# Patient Record
(demographics unavailable — no encounter records)

---

## 2017-07-04 NOTE — DIAGNOSTIC IMAGING REPORT
Indication: Pain



Technique: CT scan of the chest, abdomen, and pelvis was performed with intravenous

and oral contrast material. Axial, coronal, and sagittal images were generated.



Dose:

Total Dose Length Product - DLP 1063 mGycm.

Volume CT Dose Index - CTDIvol(s) 48.67, 13.84, 13.28 mGy.



Comparison: None



Findings:



Chest: There are is a large right pleural effusion. Volume loss is noted in most of

the right lung. There is a rounded mass in the right upper lobe measuring 4.8 cm.

This encompasses the right upper lobe bronchus. There is nodularity in the right

pleural space. And pleural space are unremarkable. There is some mediastinal

adenopathy in the precarinal region and right paratracheal region or superior aspect

of the right hilum. The heart is normal in size. Degenerative changes noted in the

spine. A calcified spur is noted at T10-T11 narrowing the spinal canal. There is 

a

calcification in the right lower lobe.



Abdomen and pelvis: The liver is diffusely low density. The gallbladder is

unremarkable. The spleen is normal. The pancreas is unremarkable. The left adrenal

gland contains a mass measuring approximately 1.9 cm. The right adrenal gland is

normal. The kidneys are normal in size. Low-density masses are noted in the 

kidneys

bilaterally consistent with cysts. The aorta is normal in caliber. Minimal

calcification is noted in the aorta. The retroperitoneum is free of adenopathy.

There is mild dilatation of proximal small bowel loops. No definite obstruction

however. The appendix is normal. The bladder is unremarkable. A double, near the

fundus of the uterus. There is narrowing of the spinal canal at L4-L5 by

degenerative changes. Multiple diverticula are noted in the colon. There is no

evidence of diverticulitis.





Impression: Mass in the right upper lobe encompassing the right upper lobe bronchus

with volume loss in the right lung and large pleural effusion. In addition,

nodularity is noted in the right pleural space. This is most consistent with 

primary

pulmonary neoplasm and malignant pleural effusion. Cytology of pleural fluid would

be helpful.



Old granulomatous disease. Heart mild dilatation of small bowel loops,

nonobstructive.



Fatty liver.



Diverticulosis. Degenerative change of the spine.



We'll cysts



Narrowing of the spinal canal as noted above at T10-T11 and L4-L5.







The CT scanner at [facility] is accredited by the American College 

of

Radiology and the scans are performed using protocols designed to limit 

radiation

exposure to as low as reasonably achievable to attain images of sufficient

resolution adequate for diagnostic evaluation.

## 2017-07-04 NOTE — DIAGNOSTIC IMAGING REPORT
Indication: Chest pain



Technique: XRAY CHEST 1 V



Comparison:None



Findings: There is a large right pleural effusion. Volume loss is noted in the 

right

lung. Left lung is clear. Heart is probably normal in size. The bones are

unremarkable.



Impression: Large right pleural effusion.

## 2017-07-04 NOTE — CONSULTATION
DATE OF CONSULTATION:  2017



CONSULTATION REPORT



CONSULTING PHYSICIAN:  Yosef Daniels M.D.



REFERRING PHYSICIAN:  Otis Quintanilla M.D.



HISTORY OF PRESENT ILLNESS:  The patient is a 58-year-old, 

female, who was well known to me for right upper lobe nodule and pleural

effusion, who presented to the emergency room [facility] for

recurrent pleural effusion.  Workup including a chest x-ray demonstrated a

right-sided pleural effusion.  Thoracic surgery was then consulted for

further evaluation.



PAST MEDICAL HISTORY:  Past medical history was notable for:



1. Hypertension.

2. Diabetes.

3. Asthma.



PAST SURGICAL HISTORY:  Was noted for multiple cosmetic surgery

including breast, lips, tummy tuck and liposuction from  to .



MEDICATIONS:  Inclusion Januvia, metformin, and antihypertensive

medication.



ALLERGIES:  The patient is known to have allergies to penicillin.



FAMILY HISTORY:  The patient's father  from car accident.

Mother was murdered.  She is  and live with her  and

daughter in [facility] and she works as a .



SOCIAL HISTORY:  The patient denies tobacco, alcohol, or illicit drug

use.



PHYSICAL EXAMINATION:

VITAL SIGNS:  She is noted to be afebrile.  Her vitals are within

normal limits.

CARDIAC:  Regular rate and rhythm.  No gallops.  No murmur.

RESPIRATORY:  Clear to auscultation on the left and decreased breath

sounds with palpable node on the right.

ABDOMEN:  Soft, nondistended, and nontender with normoactive bowel

sounds.

EXTREMITIES:  Showed no evidence of cyanosis, clubbing, or edema.



LABORATORY AND DIAGNOSTIC DATA:  Laboratory study performed on

2017 showed WBC of 9.5, hemoglobin 11, hematocrit 35 and a platelet

count of 368,000.  Sodium is 138, potassium 4.9, chloride 101, bicarbonate

was 24, BUN is 8, creatinine 0.7, and glucose is 199.  PT is 9.8, PTT 26

and INR 0.9.  A chest x-ray was performed on 2017 demonstrated a

large right-sided pleural effusion.



ASSESSMENT AND PLAN:  This is a 58-year-old,  female, who

presented with a right upper lobe nodule associated with recurrent right

pleural effusion.  The patient was evaluated at bedside after reviewing

her clinical database.  We will proceed to obtain a chest, abdomen and

pelvic CT scan and in preparation for exploratory video-assisted

thorascopic surgery, surgery for tissue diagnosis of pleurodesis.



I want to thank you for referring this patient to my attention, if there

are any questions in regard to this patient's clinical care, please do not

hesitate to contact me.









  ______________________________________________

  Rader M.D.





DR:  Isidra

D:  2017 10:12

T:  2017 12:15

JOB#:  [id number]

CC:



DAISY

## 2017-07-04 NOTE — HISTORY AND PHYSICAL
History of Present Illness


General


Reason for Hospitalization:  General Complaint





Present Illness


Allergies:  


Coded Allergies:  


     PENICILLINS (Unverified  Allergy, Unknown, 7/3/17)


Uncoded Allergies:  


     IV CONTRAST (Allergy, Mild, Hives, 7/4/17)


     PENICILLIN (Allergy, Unknown, 7/3/17)





Medication History


Scheduled


Amlodipine Besylate/Benazepril 5-20 Mg (Lotrel 5-20 Mg Capsule*), 1 CAP ORAL 

DAILY, (Reported)


Atorvastatin Calcium* (Atorvastatin Calcium*), 5 MG ORAL BEDTIME, (Reported)


Metformin Hcl* (Metformin Hcl*), 1,000 MG ORAL DAILY, (Reported)


Sitagliptin* (Januvia*), 100 MG ORAL DAILY, (Reported)





Scheduled PRN


Hydrocodone Bit/Acetaminophen * (Norco *), 1 TAB ORAL Q4H PRN for 

For Pain, (Reported)





Patient History


Healthcare decision maker





Resuscitation status


Full Code


Advanced Directive on File








Physical Exam





Last 24 Hour Vital Signs








  Date Time  Temp Pulse Resp B/P Pulse Ox O2 Delivery O2 Flow Rate FiO2


 


7/4/17 20:23  94      


 


7/4/17 20:00 98.4 97 20 128/68 98 Room Air  


 


7/4/17 16:00 98.2 119 20 151/91 94 Room Air  


 


7/4/17 11:48 98.1 106 18 113/68 96 Nasal Cannula 2.0 


 


7/4/17 07:36 97.5 88 18 121/78 96 Nasal Cannula 2.0 


 


7/4/17 04:00 97.9 91 18 120/82 94 Nasal Cannula 2.0 


 


7/4/17 02:20 98.6 94 20 140/90 95 Nasal Cannula 2.0 


 


7/4/17 01:59 98.6 94 20 140/90 95 Nasal Cannula 2.0 


 


7/4/17 00:20 98.8       


 


7/4/17 00:15  92 19 148/83 92 Room Air  


 


7/3/17 23:44  93 20 133/80 97 Room Air  

















Intake and Output  


 


 7/3/17 7/4/17





 19:00 07:00


 


Intake Total  1120 ml


 


Balance  1120 ml


 


  


 


Intake Oral  120 ml


 


IV Total  1000 ml


 


# Voids  1











Laboratory Tests








Test


  7/4/17


05:20


 


White Blood Count


  9.5 K/UL


(4.8-10.8)


 


Red Blood Count


  4.24 M/UL


(4.20-5.40)


 


Hemoglobin


  11.5 G/DL


(12.0-16.0)  L


 


Hematocrit


  35.1 %


(37.0-47.0)  L


 


Mean Corpuscular Volume 83 FL (80-99)  


 


Mean Corpuscular Hemoglobin


  27.2 PG


(27.0-31.0)


 


Mean Corpuscular Hemoglobin


Concent 32.8 G/DL


(32.0-36.0)


 


Red Cell Distribution Width


  12.3 %


(11.6-14.8)


 


Platelet Count


  360 K/UL


(150-450)


 


Mean Platelet Volume


  7.6 FL


(6.5-10.1)


 


Neutrophils (%) (Auto)


  % (45.0-75.0)


 


 


Lymphocytes (%) (Auto)


  % (20.0-45.0)


 


 


Monocytes (%) (Auto)  % (1.0-10.0)  


 


Eosinophils (%) (Auto)  % (0.0-3.0)  


 


Basophils (%) (Auto)  % (0.0-2.0)  


 


Sodium Level


  138 mEQ/L


(135-145)


 


Potassium Level


  4.9 mEQ/L


(3.4-4.9)


 


Chloride Level


  101 mEQ/L


()


 


Carbon Dioxide Level


  24 mEQ/L


(20-30)


 


Anion Gap 13 (5-15)  


 


Blood Urea Nitrogen


  8 mg/dL (7-23)


 


 


Creatinine


  0.7 mg/dL


(0.5-0.9)


 


Estimat Glomerular Filtration


Rate > 60 mL/min


(>60)


 


Glucose Level


  199 mg/dL


()  H


 


Calcium Level


  9.3 mg/dL


(8.6-10.2)


 


Total Bilirubin


  < 0.2 mg/dL


(0.0-1.2)


 


Aspartate Amino Transf


(AST/SGOT) 22 U/L (5-40)  


 


 


Alanine Aminotransferase


(ALT/SGPT) 29 U/L (3-33)  


 


 


Alkaline Phosphatase


  80 U/L


()


 


Total Protein


  7.0 g/dL


(6.6-8.7)


 


Albumin


  3.9 g/dL


(3.5-5.2)


 


Globulin 3.1 g/dL  


 


Albumin/Globulin Ratio 1.2 (1.0-2.7)  


 


Triglycerides Level


  65 mg/dL (<


150)


 


Cholesterol Level


  133 mg/dL (<


200)


 


LDL Cholesterol


  66 mg/dL


(60-99)


 


HDL Cholesterol


  54 mg/dL (>


60)


 


Cholesterol/HDL Ratio


  2.5 (3.3-4.4)


L


 


Thyroid Stimulating Hormone


(TSH) 1.120 uIU/mL


(0.300-4.500)








Height (Feet):  5


Height (Inches):  3.00


Weight (Pounds):  158


Medications





Current Medications








 Medications


  (Trade)  Dose


 Ordered  Sig/Elian


 Route


 PRN Reason  Start Time


 Stop Time Status Last Admin


Dose Admin


 


 Acetaminophen


  (Tylenol)  650 mg  Q4H  PRN


 ORAL


 T>100.5  7/4/17 17:45


 8/3/17 17:44   


 


 


 Acetaminophen/


 Hydrocodone Bitart


  (Norco 10/325)  1 ea  Q4H  PRN


 ORAL


 Moderate Pain (Pain Scale 4-6)  7/4/17 17:30


 7/11/17 17:29   


 


 


 Al Hydroxide/Mg


 Hydroxide


  (Mylanta II)  30 ml  Q6H  PRN


 ORAL


 dyspepsia  7/4/17 17:30


 8/3/17 17:29   


 


 


 Dextrose


  (Dextrose 50%)    STAT  PRN


 IV


 Hypoglycemia  7/4/17 17:30


 8/3/17 17:29   


 


 


 Insulin Aspart


  (NovoLOG)    BEFORE MEALS AND  HS


 SUBQ


   7/4/17 21:00


 8/3/17 20:59  7/4/17 21:08


 


 


 Lorazepam


  (Ativan 2mg/ml


 1ml)  0.5 mg  Q4H  PRN


 IV


 For Anxiety  7/4/17 17:30


 7/11/17 17:29   


 


 


 Morphine Sulfate


  (Morphine


 Sulfate)  1 mg  Q4H  PRN


 IVP


 Severe Pain (Pain Scale 7-10)  7/4/17 17:30


 7/11/17 17:29  7/4/17 22:44


 


 


 Ondansetron HCl


  (Zofran)  4 mg  Q6H  PRN


 IVP


 Nausea & Vomiting  7/4/17 17:30


 8/3/17 17:29   


 


 


 Polyethylene


 Glycol


  (Miralax)  17 gm  HSPRN  PRN


 ORAL


 Constipation  7/4/17 21:00


 8/3/17 20:59   


 


 


 Zolpidem Tartrate


  (Ambien)  5 mg  HSPRN  PRN


 ORAL


 Insomnia  7/4/17 21:00


 8/3/17 20:59   


 

















YESENIA MELARA Jul 4, 2017 22:45

## 2017-07-05 NOTE — CARDIOLOGY REPORT
--------------- APPROVED REPORT --------------





EKG Measurement

Heart [id number]

NE [id number]

[id number]

[id number]

[id number]





Sinus tachycardia

Otherwise normal ECG

## 2017-07-05 NOTE — ANETHESIA PREOPERATIVE EVAL
Anesthesia Pre-op PMH/ROS


General


Date of Evaluation:  2017


Time of Evaluation:  14:26


Anesthesiologist:  Junior


ASA Score:  ASA 3


Mallampati Score


Class I : Soft palate, uvula, fauces, pillars visible


Class II: Soft palate, uvula, fauces visible


Class III: Soft palate, base of uvula visible


Class IV: Only hard plate visible


Mallampati Classification:  Class III


Surgeon:  Jeremiah


Diagnosis:  R Malignant Pleural Effusion


Surgical Procedure:  R VATS


Family History:  no anesthesia problems


Allergies:  


Coded Allergies:  


     PENICILLINS (Unverified  Allergy, Unknown, 7/3/17)


Uncoded Allergies:  


     IV CONTRAST (Allergy, Mild, Hives, 17)


Medications:  see eMAR





Past Medical History


Cardiovascular:  Reports: HTN


Pulmonary:  Reports: asthma, other - Malignant Pleural Effusion


Endocrine:  Reports: DM





Anesthesia Pre-op Phys. Exam


Physician Exam





Last Vital Signs








  Date Time  Temp Pulse Resp B/P Pulse Ox O2 Delivery O2 Flow Rate FiO2


 


17 12:00  93      


 


17 11:46 98.2  18 139/92 96 Nasal Cannula 2.0 








Constitutional:  NAD


Neurologic:  CN 2-12 intact


Cardiovascular:  RRR


Respiratory:  CTA


Gastrointestinal:  S/NT/ND





Airway Exam


Mallampati Score:  Class III


MO:  full


ROM:  full


Teeth:  intact - Large Teeth





Anesthesia Pre-op A/P


Labs





Coagulation








Test


  17


11:15


 


Prothrombin Time


  9.6 SEC


(9.30-11.50)


 


Prothromb Time International


Ratio 0.9 (0.9-1.1)  


 











Risk Assessment & Plan


Assessment:


ASA 3


Plan:


GA, NAYELI, BIS, Glidescope


Status Change Before Surgery:  No





Pre-Antibiotics


Dru Grams Ancef IV


Given Within 1 Hr of Incision:  Yes


Time Given:  15:18











Vincenzo Pacheco MD 2017 14:23

## 2017-07-05 NOTE — PULMONOLOGY PROGRESS NOTE
Assessment/Plan


Problems:  


(1) Lung mass


(2) Recurrent pleural effusion on right


Assessment/Plan


for thoracoscopy today





Subjective


ROS Limited/Unobtainable:  No


Interval Events:  pain in better controlled


Constitutional:  Reports: no symptoms


HEENT:  Repors: no symptoms


Allergies:  


Coded Allergies:  


     PENICILLINS (Unverified  Allergy, Unknown, 7/3/17)


Uncoded Allergies:  


     IV CONTRAST (Allergy, Mild, Hives, 7/4/17)


     PENICILLIN (Allergy, Unknown, 7/3/17)





Objective





Last 24 Hour Vital Signs








  Date Time  Temp Pulse Resp B/P Pulse Ox O2 Delivery O2 Flow Rate FiO2


 


7/5/17 11:46 98.2 94 18 139/92 96 Nasal Cannula 2.0 


 


7/5/17 08:34 97.9 109 18 137/86 96 Nasal Cannula 2.0 


 


7/5/17 08:00  96      


 


7/5/17 04:00 97.9 96 20 135/80 95 Room Air  


 


7/5/17 03:37  93      


 


7/5/17 00:00 97.2 89 20 135/84 98 Room Air  


 


7/4/17 23:46  87      


 


7/4/17 20:23  94      


 


7/4/17 20:00 98.4 97 20 128/68 98 Room Air  


 


7/4/17 16:00 98.2 119 20 151/91 94 Room Air  

















Intake and Output  


 


 7/4/17 7/5/17





 19:00 07:00


 


Intake Total 360 ml 


 


Balance 360 ml 


 


  


 


Intake Oral 360 ml 


 


# Voids 3 3








General Appearance:  WD/WN, no acute distress


HEENT:  normocephalic, atraumatic


Respiratory/Chest:  chest wall non-tender, lungs clear


Breasts:  no masses


Cardiovascular:  normal peripheral pulses


Abdomen:  normal bowel sounds, soft, non tender


Genitourinary:  normal external genitalia


Extremities:  no cyanosis


Skin:  no rash, no ulcers


Neurologic/Psychiatric:  CNs II-XII grossly normal, no motor/sensory deficits


Lymphatic:  no neck adenopathy, no groin adenopathy


Laboratory Tests


7/5/17 11:15: 


Prothrombin Time [Pending], Prothromb Time International Ratio [Pending]





Current Medications








 Medications


  (Trade)  Dose


 Ordered  Sig/Elian


 Route


 PRN Reason  Start Time


 Stop Time Status Last Admin


Dose Admin


 


 Acetaminophen


  (Tylenol)  650 mg  Q4H  PRN


 ORAL


 T>100.5  7/4/17 17:45


 8/3/17 17:44   


 


 


 Acetaminophen/


 Hydrocodone Bitart


  (Norco 10/325)  1 ea  Q4H  PRN


 ORAL


 Moderate Pain (Pain Scale 4-6)  7/4/17 17:30


 7/11/17 17:29   


 


 


 Al Hydroxide/Mg


 Hydroxide


  (Mylanta II)  30 ml  Q6H  PRN


 ORAL


 dyspepsia  7/4/17 17:30


 8/3/17 17:29   


 


 


 Dextrose


  (Dextrose 50%)    STAT  PRN


 IV


 Hypoglycemia  7/4/17 17:30


 8/3/17 17:29   


 


 


 Insulin Aspart


  (NovoLOG)    BEFORE MEALS AND  HS


 SUBQ


   7/4/17 21:00


 8/3/17 20:59  7/5/17 06:41


 


 


 Lorazepam


  (Ativan 2mg/ml


 1ml)  0.5 mg  Q4H  PRN


 IV


 For Anxiety  7/4/17 17:30


 7/11/17 17:29   


 


 


 Morphine Sulfate


  (Morphine


 Sulfate)  1 mg  Q3H  PRN


 IVP


 Severe Pain (Pain Scale 7-10)  7/5/17 01:45


 7/12/17 01:44  7/5/17 12:04


 


 


 Morphine Sulfate


  (Morphine


 Sulfate)  1 mg  Q3H  PRN


 IVP


 Breakthrough Pain  7/5/17 13:00


 7/12/17 12:59   


 


 


 Ondansetron HCl


  (Zofran)  4 mg  Q6H  PRN


 IVP


 Nausea & Vomiting  7/4/17 17:30


 8/3/17 17:29   


 


 


 Polyethylene


 Glycol


  (Miralax)  17 gm  HSPRN  PRN


 ORAL


 Constipation  7/4/17 21:00


 8/3/17 20:59   


 


 


 Zolpidem Tartrate


  (Ambien)  5 mg  HSPRN  PRN


 ORAL


 Insomnia  7/4/17 21:00


 8/3/17 20:59   


 

















YESENIA MELARA Jul 5, 2017 12:50

## 2017-07-05 NOTE — PRE-PROCEDURE NOTE/ATTESTATION
Pre-Procedure Note/Attestation


Complete Prior to Procedure


Planned Procedure:  right


Procedure Narrative:


Bronchoscopy, right exploratory video-assisted thoracoscopic surgery, possible 

talc pleurodesis or pleurex catheter





Indications for Procedure


Pre-Operative Diagnosis:


Recurrent right pleural effusion





Attestation


I attest that I discussed the nature of the procedure; its benefits; risks and 

complications; and alternatives (and the risks and benefits of such alternatives

), prior to the procedure, with the patient (or the patient's legal 

representative).





I attest that, if there was a reasonable possibility of needing a blood 

transfusion, the patient (or the patient's legal representative) was given the 

[place] Department of [employer] Services standardized written summary, pursuant 

to the [employer] [place] Blood Safety Act ([place] Health and Safety Code # 1645, as 

amended).





I attest that I re-evaluated the patient just prior to the surgery and that 

there has been no change in the patient's H&P, except as documented below:











TANMAY GRAHAM M.D. Jul 5, 2017 14:14

## 2017-07-05 NOTE — IMMEDIATE POST-OP EVALUATION
Immediate Post-Op Evalulation


Immediate Post-Op Evalulation


Procedure:  R VATS


Date of Evaluation:  2017


Time of Evaluation:  17:29


IV Fluids:  1000 LR


Blood Products:  0


Estimated Blood Loss:  100


Urinary Output:  0


Blood Pressure Systolic:  139


Blood Pressure Diastolic:  96


Pulse Rate:  72


Respiratory Rate:  16


O2 Sat by Pulse Oximetry:  99


Temperature (Fahrenheit):  97.6


Pain Score (1-10):  2


Nausea:  No


Vomiting:  No


Complications


0


Patient Status:  awake, reacts, patent, extubated, none


Hydration Status:  adequate


Dru grams Ancef iv


Given Within 1 Hr of Incision:  Yes


Time Given:  15:18











Vincenzo Pacheco MD 2017 17:22

## 2017-07-05 NOTE — CARDIOLOGY REPORT
--------------- APPROVED REPORT --------------





EKG Measurement

Heart [id number]

AZ 138P40

[id number]

[id number]

[id number]





Sinus tachycardia

Otherwise normal ECG

## 2017-07-05 NOTE — OPERATIVE NOTE - DICTATED
DATE OF OPERATION:  07/05/2017



SURGEON:  Yosef Daniels M.D.



PREOPERATIVE DIAGNOSIS:  Right recurrent pleural effusion.



POSTOPERATIVE DIAGNOSIS:  Right metastatic stage IV carcinoma.



PROCEDURE PERFORMED:



1. Flexible bronchoscopy.

2. Right video-assisted thoracoscopic surgery.

3. Intrapleural pneumolysis.

4. Partial pleurectomy.

5. Talc pleurodesis.

6. PleurX catheter placement.

7. Intercostal nerve block.



ANESTHESIA:  Double-lumen general anesthesia.



INDICATION:  The patient is a 58-year-old,  female who

presented to [facility] with a recurrent right-sided pleural

effusion.  Chest CT scan demonstrated a 5 cm right upper lobe mass

associated with a recurrent right pleural effusion.  She was taken to the

operating room for definitive care.



The risks and benefits of the proposed operation were explained to the

patient in detail including, but not limited to bleeding, infection, air

leak, need for blood transfusion, anesthetic complication, and death of

less 1%.  In addition, alternative versus no treatment options were also

discussed.  The patient fully understands the rationale behind the

proposed operation.  All questions were answered to her satisfaction.



DESCRIPTION OF PROCEDURE:  After obtaining the informed consent, the

patient was brought to the operating room and placed in a supine position.

A surgical time-out was performed.  After induction of general

anesthesia, an arterial line and BRITTANY hose stockings were placed.



A flexible bronchoscope was introduced through the endotracheal tube.

The trachea and margaux were inspected.  The margaux was in midline and

sharp.  The right tracheobronchial tree down to the subsegmental level was

grossly normal, and no endobronchial lesions were seen.  Similarly, the

left mainstem bronchus was intubated and no endobronchial lesions were

visualized at the subsegmental level.  A minimal amount of mucopurulent

secretion was lavaged and suctioned clear.  The bronchoscope was pulled

back and removed.  The patient tolerated the procedure well.  Oxygen

saturation greater than 96% throughout the procedure.



Next, the patient was then placed in a left lateral decubitus position,

and prepped and draped in the usual sterile fashion.  The patient also

received preoperative intravenous antibiotics.  A 1.5 cm incision was made

at the fifth intercostal space in mid axillary line.  This allowed the

introduction of a Thoracoport as well as a video camera into the right

chest.  Passing of a Yankauer sucker into the right hemithorax yielded

roughly 2 liters of right pleural effusion and this was submitted to

cytology.  A counter incision was made at the third intercostal space

posterior to the mid axillary line.  This allowed the introduction of a

grasper into the right hemithorax.  Under direct visualization, there was

noted to be numerous adhesions tethering the lung to the surrounding chest

wall.  In addition, there was a gross metastatic disease as the parietal

pleura demonstrated multiple nodules.  Further more, the lung was also

shown to have multiple nodules suggestive of metastatic disease.  After

freeing the lung from the surrounding chest wall, we then proceeded to

perform a partial pleurectomy, taken a strip of parietal pleura, and this

was delivered off the field for frozen section.  Frozen section of the

parietal pleura was consistent with a carcinoma.  For the origin of the

carcinoma, we await final pathology.  



At this point, we then asked the anesthesiologist to apply positive ventilation 
to 

the right lung.  It was noted that the lung expanded in the upper lung zone; 
however, 

there was a 30% basilar hydropneumothorax due to the inability of the

lung to fully expand in the lower chest cavity.  As such, a decision was made

to perform both pleurodesis as well as PleurX  catheter.  If the patient

is shown to have a full expansion of the lung postoperatively, then the

PleurX catheter can be removed.  Otherwise, the patient will be discharged

with PleurX catheter for persistent pleural effusion.  8 g of aerosolized

talc was then infused into the right chest under direct visualization in

the usual fashion.  A PleurX catheter was then also placed into the right

hemithorax and placed above the diaphragm.  The PleurX was anchored at the

skin level using a 3-0 silk suture.  A 28-Kinyarwanda chest tube was inserted

into the right hemithorax and the lung was allowed to inflate and the

video camera was then removed.  The chest tube was then anchored at the

skin level using 0 Ethibond sutures x2.  The wound was reapproximated with

3-0 Vicryl x2 and the skin was reapproximated with 4-0 Monocryl.

Steri-Strips and dry dressing were applied.  Both PleurX catheter and

chest tube were connected to a separate Pleur-evac.  The patient tolerated

the procedure well without any complications.  Needle and sponge counts

were correct.  At the end of the operation, she was extubated and

transported to the recovery room in a satisfactory condition.



EBL:  Minimal.



COMPLICATIONS:  None.



SPECIMEN SUBMITTED:  Right pleural effusion 2 liters and a right

parietal pleura.









  ______________________________________________

  Rader M.D.





DR:  FARIDEH

D:  07/05/2017 17:41

T:  07/05/2017 21:17

JOB#:  [id number]

CC:



DAISY

## 2017-07-06 NOTE — 48 HOUR POST ANESTHESIA EVAL
Post Anesthesia Evaluation


Procedure:  R VATS


Date of Evaluation:  Jul 6, 2017


Time of Evaluation:  13:18


Blood Pressure Systolic:  112


0:  72


Pulse Rate:  76


Respiratory Rate:  20


Temperature (Fahrenheit):  97.2


O2 Sat by Pulse Oximetry:  98


Airway:  patent


Nausea:  No


Vomiting:  No


Pain Intensity:  3


Hydration Status:  adequate


Cardiopulmonary Status:


stable


Mental Status/LOC:  patient returned to baseline


Follow-up Care/Observations:


n/a


Post-Anesthesia Complications:


none


Follow-up care needed:  N/A











NEGRITO SUBRAMANIAN M.D. Jul 6, 2017 13:19

## 2017-07-06 NOTE — CONSULTATION
Consult Note


Consult Note


Patient: URBAN PHAN


[facility] Rec #: [id number]


Patient No.: [id number]


Date of Admission: 17        








Infectious Diseases Consultation for Dr. Pierre


 


DATE OF CONSULTATION:  2017





CONSULTING PHYSICIAN:  Francoise Dale MD, MTM&H





REFERRING PHYSICIAN:  Yesenia Quintanilla M.D.





HISTORY OF PRESENT ILLNESS:  


58-year-old,  female POD#1 R VATS with talc pleuradesis for RUL mass 

and pleural effusion with prior pleural cytology concerning for malignancy.  ID 

consultation requested for post operative leukocytosis w/o fevers.





Preoperatively was w/o fevers with peripheral blood WBC 9.5.   Immediately post-

op WBC was 23.4 and declined to 20 this morning w/o intervention.  She had 

received a routine perioperative dose of IV cefazolin x1 and her instillation 

fluid included amikacin, and since has not been on abx.  She is ordered to 

received IV cefazolin starting this evening.  





PAST MEDICAL HISTORY: 





1. Hypertension.


2. Diabetes.


3. Asthma.


4. Pulmonary mass, R pleural effusion





PAST SURGICAL HISTORY:  


multiple cosmetic surgery including breast, lips, tummy tuck and liposuction 

from  to .





Home MEDICATIONS:  


Januvia


metformin


antihypertensive medication





ALLERGIES:  PCN-->syncope





FAMILY HISTORY:  The patient's father  from car accident.


Mother was murdered.  She is  and live with her  and


daughter and she works as a .





SOCIAL HISTORY:  The patient denies tobacco, alcohol, or illicit drug


use.





PHYSICAL EXAMINATION:  Tm 99.7F


VITAL SIGNS:  She is noted to be afebrile.  Her vitals are within


normal limits.


CARDIAC:  Regular rate and rhythm.  No gallops.  No murmur.


RESPIRATORY:  Clear to auscultation on the left and decreased breath


sounds with palpable node on the right.  R chest tube x2 in place.  


ABDOMEN:  Soft, nondistended, and nontender with normoactive bowel


sounds.


EXTREMITIES:  Showed no evidence of cyanosis, clubbing, or edema.


SKIN:  no rash





LABORATORY AND DIAGNOSTIC DATA:  admission


2017 showed WBC of 9.5, hemoglobin 11, hematocrit 35 and a platelet


count of 368,000.  Sodium is 138, potassium 4.9, chloride 101, bicarbonate


was 24, BUN is 8, creatinine 0.7, and glucose is 199.  PT is 9.8, PTT 26


and INR 0.9.  A chest x-ray was performed on 2017 demonstrated a


large right-sided pleural effusion.





Patient : URBAN PHAN  Referring Physician:  Kirk Clark M.D.  


ID Number: [id number]  Service Date:  17  


: 1959  Report Date:  17  


Gender: F  Accession No.:  [id number]  


Location: 4W      








Procedure: CT Chest Abdomen Pelvis W/Cont





Indication: Pain





Technique: CT scan of the chest, abdomen, and pelvis was performed with 

intravenous


and oral contrast material. Axial, coronal, and sagittal images were generated.





Dose:


Total Dose Length Product - DLP 1063 mGycm.


Volume CT Dose Index - CTDIvol(s) 48.67, 13.84, 13.28 mGy.





Comparison: None





Findings:





Chest: There are is a large right pleural effusion. Volume loss is noted in 

most of


the right lung. There is a rounded mass in the right upper lobe measuring 4.8 

cm.


This encompasses the right upper lobe bronchus. There is nodularity in the right


pleural space. And pleural space are unremarkable. There is some mediastinal


adenopathy in the precarinal region and right paratracheal region or superior 

aspect


of the right hilum. The heart is normal in size. Degenerative changes noted in 

the


spine. A calcified spur is noted at T10-T11 narrowing the spinal canal. There 

is 


a


calcification in the right lower lobe.





Abdomen and pelvis: The liver is diffusely low density. The gallbladder is


unremarkable. The spleen is normal. The pancreas is unremarkable. The left 

adrenal


gland contains a mass measuring approximately 1.9 cm. The right adrenal gland is


normal. The kidneys are normal in size. Low-density masses are noted in the 


kidneys


bilaterally consistent with cysts. The aorta is normal in caliber. Minimal


calcification is noted in the aorta. The retroperitoneum is free of adenopathy.


There is mild dilatation of proximal small bowel loops. No definite obstruction


however. The appendix is normal. The bladder is unremarkable. A double, near the


fundus of the uterus. There is narrowing of the spinal canal at L4-L5 by


degenerative changes. Multiple diverticula are noted in the colon. There is no


evidence of diverticulitis.








Impression: Mass in the right upper lobe encompassing the right upper lobe 

bronchus


with volume loss in the right lung and large pleural effusion. In addition,


nodularity is noted in the right pleural space. This is most consistent with 


primary


pulmonary neoplasm and malignant pleural effusion. Cytology of pleural fluid 

would


be helpful.





Old granulomatous disease. Heart mild dilatation of small bowel loops,


nonobstructive.





Fatty liver.





Diverticulosis. Degenerative change of the spine.





We'll cysts





Narrowing of the spinal canal as noted above at T10-T11 and L4-L5.











The CT scanner at [facility] is accredited by the [employer] 


of


Radiology and the scans are performed using protocols designed to limit 


radiation


exposure to as low as reasonably achievable to attain images of sufficient


resolution adequate for diagnostic evaluation.








Dictated By: FRANCOISE PRITCHARD M.D.   


Electronically Signed By: FRANCOISE PRITCHARD M.D.   


Signed Date/Time 17 0836








Patient : URBAN PHAN  Referring Physician:  YESENIA QUINTANILLA  


ID Number: [id number]  Service Date:  17  


: 1959  Report Date:  17  


Gender: F  Accession No.:  [id number]  


Location: ICU      








Procedure: XRAY Chest 1v





Indication: Dyspnea





Comparison:  2017





A single view chest radiograph was obtained.





Findings:





Right chest tube again noted. Ill-defined parenchymal opacities demonstrated 

within


the expanded portion of the right lung probably representing atelectasis.


Reexpansion edema is possible. Pneumonia is possible. The pleural effusion has


slightly reaccumulated. There is a small right basilar pneumothorax. Heart size 


is


stable.





Impression:





Increasing right basilar opacification which is probably on the basis of 


increasing


adjacent pleural effusion and/or worsening infiltrate/edema compared to one day


earlier. Small right basilar pneumothorax again noted.


Assessment/Plan


ASSESSMENT:  59 y/o  female POD#1 for R VATS with talc pleurodesis for 

malignant pleural effusion now with post operative leukocytosis.  She had been 

afebrile, now with low grade T99.7F this afternoon, and but otherwise 

clinically looks well.  her leukocytosis is consistent with local inflammatory 

reaction d/t talc, and I expect her to have some low grade fevers over the next 

few days.  CXR with some opacification of R lower hemidiaphragm which isn't 

unexpected after her procedure and at this time i am not convinced she has a 

pneumonia.  Reasonable to continue IV cefazolin empirically for 2-3 days at 

least as we monitor her early post op.





//post op leukocytosis


//R pleural effusion, malignant


//perioperative antibiotics











PLAN:  


continue empiric IV cefazolin for now


monitor CBC daily


monitor T curve


monitor resp status


screen for MRSA colonization





I want to thank you for referring this patient, and I will continue to follow.  

Please do not hesitate to contact me with any clinical change in status. 





 [phone]











Francoise Dale M.D. 2017 17:02

## 2017-07-06 NOTE — DIAGNOSTIC IMAGING REPORT
Indication: Dyspnea



Comparison:  7/5/2017



A single view chest radiograph was obtained.



Findings:



Right chest tube again noted. Ill-defined parenchymal opacities demonstrated within

the expanded portion of the right lung probably representing atelectasis.

Reexpansion edema is possible. Pneumonia is possible. The pleural effusion has

slightly reaccumulated. There is a small right basilar pneumothorax. Heart size 

is

stable.



Impression:



Increasing right basilar opacification which is probably on the basis of 

increasing

adjacent pleural effusion and/or worsening infiltrate/edema compared to one day

earlier. Small right basilar pneumothorax again noted.

## 2017-07-06 NOTE — DIAGNOSTIC IMAGING REPORT
Indication: Dyspnea



Comparison:  7/3/17



A single view chest radiograph was obtained.



Findings:



Right chest tube noted. There is a small pneumothorax at the right lung base.

Ill-defined parenchymal opacification of the recently expanded right lower lobe

noted. This baby reexpansion pulmonary edema and/or residual atelectasis.



Impression:



Status post right thoracentesis/chest tube placement. Small pneumothorax 

noted.

Reexpansion pulmonary edema and/or atelectasis of the lower lobe noted.

## 2017-07-07 NOTE — DIAGNOSTIC IMAGING REPORT
Indication: Dyspnea



Comparison:  7/6/17



A single view chest radiograph was obtained.



Findings:



Patchy, ill-defined densities involving the right lung again demonstrated 

without

significant change. Right pleural effusion is noted. Right basilar pneumothorax 

is

probably still present. Heart size is stable. There is probable mild interstitial

edema as well.



Impression:



Evidence of slightly increased interstitial edema compared to one day earlier. No

change otherwise

## 2017-07-07 NOTE — DIAGNOSTIC IMAGING REPORT
--------------- APPROVED REPORT --------------





CPT Code: 73988



Present Symptoms

Lower Extremity Pain:  Bilateral 





BILATERAL: Imaging reveals a patent deep venous system bilaterally. There is no evidence 

of thrombus within the femoral, popliteal or tibial segments. The greater saphenous veins 

are also within normal limits. Doppler indicates normal spontaneous flow within these 

segments.

## 2017-07-07 NOTE — INFECTIOUS DISEASES PROG NOTE
Assessment/Plan


Assessment/Plan


ASSESSMENT:  57 y/o  female POD#2 for R VATS with talc pleurodesis for 

malignant pleural effusion  with post operative leukocytosis that is 

downtrending to 18 today.  Has completed routine 3 doses postoperative 

cefazolin.  She had been afebrile since a low grade 99.7F when I was evaluating 

her for initial consultation yesterday, .  her leukocytosis is consistent with 

local inflammatory reaction d/t talc, and I expect her to have some low grade 

fevers over the next few days.  Repeat CXR w/o pneumonia.  





//post op leukocytosis


//R pleural effusion, malignant


//perioperative antibiotics











PLAN:  


Monitor off of antibiotics


monitor CBC daily


monitor T curve


monitor resp status


screen for MRSA colonization





Subjective


Constitutional:  Reports: no symptoms


HEENT:  Reports: no symptoms


Respiratory:  Reports: no symptoms


Cardiovascular:  Reports: no symptoms


Gastrointestinal/Abdominal:  Reports: no symptoms


Skin:  Reports: no symptoms


Allergies:  


Coded Allergies:  


     PENICILLINS (Unverified  Allergy, Unknown, 7/3/17)


Uncoded Allergies:  


     IV CONTRAST (Allergy, Mild, Hives, 7/4/17)





Objective


Vital Signs





Last 24 Hour Vital Signs








  Date Time  Temp Pulse Resp B/P Pulse Ox O2 Delivery O2 Flow Rate FiO2


 


7/7/17 12:00  118      


 


7/7/17 11:31 97.8 112 20 116/73 94 Room Air  


 


7/7/17 08:08 99.0 100 20 107/68 96 Nasal Cannula 2.0 


 


7/7/17 07:47  98      


 


7/7/17 04:00  107      


 


7/7/17 03:52 99.0 101 20 108/70 96 Nasal Cannula 2.0 


 


7/7/17 00:00  99      


 


7/7/17 00:00 98.3 102 20 112/73 99 Nasal Cannula 2.0 


 


7/6/17 20:00  119      


 


7/6/17 20:00 98.2 105 20 112/73 97 Nasal Cannula 2.0 


 


7/6/17 19:40     99 Nasal Cannula 2.0 28


 


7/6/17 19:40      Nasal Cannula 2.0 28


 


7/6/17 18:57 97.2       


 


7/6/17 17:23 97.2       


 


7/6/17 16:00  117      


 


7/6/17 16:00 99.7 110 18 108/63 96 Nasal Cannula 2.0 


 


7/6/17 15:00  108 20 106/67 98 Nasal Cannula 2.0 


 


7/6/17 14:00  112 22 109/75 98 Nasal Cannula 2.0 


 


7/6/17 13:19  76 20  98   


 


7/6/17 13:00  110 22 110/69 98 Nasal Cannula 2.0 








Height (Feet):  5


Height (Inches):  3.00


Weight (Pounds):  140


General Appearance:  no acute distress


HEENT:  anicteric


Respiratory/Chest:  other - Stable decreased breath sounds at right lung base.  

chest tubes discontinued.


Cardiovascular:  normal peripheral pulses, normal rate, regular rhythm, no 

gallop/murmur


Abdomen:  soft, non tender, no organomegaly, non distended


Extremities:  no cyanosis, no clubbing, no edema


Skin:  no rash





Laboratory Tests








Test


  7/7/17


04:20


 


White Blood Count


  18.9 K/UL


(4.8-10.8)  H


 


Red Blood Count


  4.31 M/UL


(4.20-5.40)


 


Hemoglobin


  11.7 G/DL


(12.0-16.0)  L


 


Hematocrit


  35.4 %


(37.0-47.0)  L


 


Mean Corpuscular Volume 82 FL (80-99)  


 


Mean Corpuscular Hemoglobin


  27.2 PG


(27.0-31.0)


 


Mean Corpuscular Hemoglobin


Concent 33.1 G/DL


(32.0-36.0)


 


Red Cell Distribution Width


  12.2 %


(11.6-14.8)


 


Platelet Count


  356 K/UL


(150-450)


 


Mean Platelet Volume


  7.2 FL


(6.5-10.1)


 


Neutrophils (%) (Auto)


  % (45.0-75.0)


 


 


Lymphocytes (%) (Auto)


  % (20.0-45.0)


 


 


Monocytes (%) (Auto)  % (1.0-10.0)  


 


Eosinophils (%) (Auto)  % (0.0-3.0)  


 


Basophils (%) (Auto)  % (0.0-2.0)  


 


Differential Total Cells


Counted 100  


 


 


Neutrophils % (Manual) 80 % (45-75)  H


 


Lymphocytes % (Manual) 9 % (20-45)  L


 


Monocytes % (Manual) 11 % (1-10)  H


 


Eosinophils % (Manual) 0 % (0-3)  


 


Basophils % (Manual) 0 % (0-2)  


 


Band Neutrophils 0 % (0-8)  


 


Platelet Estimate Adequate  


 


Platelet Morphology Normal  


 


Hypochromasia 1+  


 


Sodium Level


  130 mEQ/L


(135-145)  L


 


Potassium Level


  4.3 mEQ/L


(3.4-4.9)


 


Chloride Level


  95 mEQ/L


()  L


 


Carbon Dioxide Level


  27 mEQ/L


(20-30)


 


Anion Gap 8 (5-15)  


 


Blood Urea Nitrogen


  12 mg/dL


(7-23)


 


Creatinine


  0.6 mg/dL


(0.5-0.9)


 


Estimat Glomerular Filtration


Rate > 60 mL/min


(>60)


 


Glucose Level


  151 mg/dL


()  H


 


Calcium Level


  9.2 mg/dL


(8.6-10.2)


 


Total Bilirubin


  0.3 mg/dL


(0.0-1.2)


 


Aspartate Amino Transf


(AST/SGOT) 17 U/L (5-40)  


 


 


Alanine Aminotransferase


(ALT/SGPT) 15 U/L (3-33)  


 


 


Alkaline Phosphatase


  57 U/L


()


 


Pro-B-Type Natriuretic Peptide


  56 pg/mL


(0-125)


 


Total Protein


  6.1 g/dL


(6.6-8.7)  L


 


Albumin


  2.9 g/dL


(3.5-5.2)  L


 


Globulin 3.2 g/dL  


 


Albumin/Globulin Ratio


  0.9 (1.0-2.7)


L











Current Medications








 Medications


  (Trade)  Dose


 Ordered  Sig/Elian


 Route


 PRN Reason  Start Time


 Stop Time Status Last Admin


Dose Admin


 


 Acetaminophen


  (Tylenol)  650 mg  Q4H  PRN


 ORAL


 T>100.5  7/6/17 16:30


 8/5/17 16:29   


 


 


 Acetaminophen/


 Hydrocodone Bitart


  (Norco 10/325)  1 ea  Q4H  PRN


 ORAL


 Moderate Pain (Pain Scale 4-6)  7/6/17 16:30


 7/13/17 16:29  7/7/17 08:17


 


 


 Acetaminophen/


 Hydrocodone Bitart


  (Norco 5/325)  1 tab  Q4H  PRN


 ORAL


 Mild Pain (Pain Scale 1-3)  7/6/17 16:30


 7/13/17 16:29   


 


 


 Al Hydroxide/Mg


 Hydroxide


  (Mylanta II)  30 ml  Q6H  PRN


 ORAL


 dyspepsia  7/6/17 16:30


 8/5/17 16:29   


 


 


 Dextrose


  (Dextrose 50%)    STAT  PRN


 IV


 Hypoglycemia  7/6/17 16:30


 8/5/17 16:29   


 


 


 Docusate Sodium


  (Colace)  100 mg  TWICE A  DAY


 ORAL


   7/6/17 18:00


 8/5/17 17:59  7/7/17 08:15


 


 


 Insulin Aspart


  (NovoLOG)    BEFORE MEALS AND  HS


 SUBQ


   7/6/17 16:30


 8/5/17 16:29  7/7/17 11:28


 


 


 Lorazepam


  (Ativan 2mg/ml


 1ml)  0.5 mg  Q4H  PRN


 IV


 For Anxiety  7/6/17 16:30


 7/13/17 16:29   


 


 


 Magnesium


 Hydroxide


  (Mom)  30 ml  BIDPRN  PRN


 ORAL


 Constipation  7/6/17 16:30


 8/5/17 16:29   


 


 


 Ondansetron HCl


  (Zofran)  4 mg  Q6H  PRN


 IVP


 Nausea & Vomiting  7/6/17 16:30


 8/5/17 16:29   


 


 


 Polyethylene


 Glycol


  (Miralax)  17 gm  HSPRN  PRN


 ORAL


 Constipation  7/6/17 21:00


 8/5/17 20:59   


 


 


 Sennosides


  (Senokot)  8.6 mg  BIDPRN  PRN


 ORAL


 Constipation  7/6/17 16:30


 8/5/17 16:29   


 


 


 Zolpidem Tartrate


  (Ambien)  5 mg  HSPRN  PRN


 ORAL


 Insomnia  7/6/17 21:00


 8/5/17 20:59   


 

















Surjit Dale M.D. Jul 7, 2017 13:00

## 2017-07-07 NOTE — PULMONOLOGY PROGRESS NOTE
Assessment/Plan


Problems:  


(1) Lung mass


(2) Recurrent pleural effusion on right


Assessment/Plan





chest tube removed


med/surg


pain management





cxr in am





Subjective


ROS Limited/Unobtainable:  No


Interval Events:  one tube is out, d/w dr Daniels


Allergies:  


Coded Allergies:  


     PENICILLINS (Unverified  Allergy, Unknown, 7/3/17)


Uncoded Allergies:  


     IV CONTRAST (Allergy, Mild, Hives, 7/4/17)





Objective





Last 24 Hour Vital Signs








  Date Time  Temp Pulse Resp B/P Pulse Ox O2 Delivery O2 Flow Rate FiO2


 


7/7/17 11:31 97.8 112 20 116/73 94 Room Air  


 


7/7/17 08:08 99.0 100 20 107/68 96 Nasal Cannula 2.0 


 


7/7/17 07:47  98      


 


7/7/17 04:00  107      


 


7/7/17 03:52 99.0 101 20 108/70 96 Nasal Cannula 2.0 


 


7/7/17 00:00  99      


 


7/7/17 00:00 98.3 102 20 112/73 99 Nasal Cannula 2.0 


 


7/6/17 20:00  119      


 


7/6/17 20:00 98.2 105 20 112/73 97 Nasal Cannula 2.0 


 


7/6/17 19:40     99 Nasal Cannula 2.0 28


 


7/6/17 19:40      Nasal Cannula 2.0 28


 


7/6/17 18:57 97.2       


 


7/6/17 17:23 97.2       


 


7/6/17 16:00  117      


 


7/6/17 16:00 99.7 110 18 108/63 96 Nasal Cannula 2.0 


 


7/6/17 15:00  108 20 106/67 98 Nasal Cannula 2.0 


 


7/6/17 14:00  112 22 109/75 98 Nasal Cannula 2.0 


 


7/6/17 13:19  76 20  98   


 


7/6/17 13:00  110 22 110/69 98 Nasal Cannula 2.0 


 


7/6/17 12:45 98.9       


 


7/6/17 12:00  109      


 


7/6/17 12:00 98.9 109 24 112/72 97 Nasal Cannula 2.0 

















Intake and Output  


 


 7/6/17 7/7/17





 19:00 07:00


 


Intake Total 540 ml 341.6 ml


 


Output Total 400 ml 1230 ml


 


Balance 140 ml -888.4 ml


 


  


 


Intake Oral 500 ml 200 ml


 


IV Total 40 ml 141.6 ml


 


Output Urine Total 230 ml 1000 ml


 


Chest Tube Drainage Total 170 ml 230 ml


 


# Voids 3 4








General Appearance:  WD/WN


HEENT:  normocephalic


Respiratory/Chest:  chest wall non-tender, lungs clear


Cardiovascular:  normal peripheral pulses, normal rate


Abdomen:  normal bowel sounds, soft, non tender


Genitourinary:  normal external genitalia


Extremities:  no clubbing


Skin:  no rash


Laboratory Tests


7/7/17 04:20: 


White Blood Count 18.9H, Red Blood Count 4.31, Hemoglobin 11.7L, Hematocrit 

35.4L, Mean Corpuscular Volume 82, Mean Corpuscular Hemoglobin 27.2, Mean 

Corpuscular Hemoglobin Concent 33.1, Red Cell Distribution Width 12.2, Platelet 

Count 356, Mean Platelet Volume 7.2, Neutrophils (%) (Auto) , Lymphocytes (%) (

Auto) , Monocytes (%) (Auto) , Eosinophils (%) (Auto) , Basophils (%) (Auto) , 

Differential Total Cells Counted 100, Neutrophils % (Manual) 80H, Lymphocytes % 

(Manual) 9L, Monocytes % (Manual) 11H, Eosinophils % (Manual) 0, Basophils % (

Manual) 0, Band Neutrophils 0, Platelet Estimate Adequate, Platelet Morphology 

Normal, Hypochromasia 1+, Sodium Level 130L, Potassium Level 4.3, Chloride 

Level 95L, Carbon Dioxide Level 27, Anion Gap 8, Blood Urea Nitrogen 12, 

Creatinine 0.6, Estimat Glomerular Filtration Rate > 60, Glucose Level 151H, 

Calcium Level 9.2, Total Bilirubin 0.3, Aspartate Amino Transf (AST/SGOT) 17, 

Alanine Aminotransferase (ALT/SGPT) 15, Alkaline Phosphatase 57, Pro-B-Type 

Natriuretic Peptide 56, Total Protein 6.1L, Albumin 2.9L, Globulin 3.2, Albumin/

Globulin Ratio 0.9L





Current Medications








 Medications


  (Trade)  Dose


 Ordered  Sig/Elian


 Route


 PRN Reason  Start Time


 Stop Time Status Last Admin


Dose Admin


 


 Acetaminophen


  (Tylenol)  650 mg  Q4H  PRN


 ORAL


 T>100.5  7/6/17 16:30


 8/5/17 16:29   


 


 


 Acetaminophen/


 Hydrocodone Bitart


  (Norco 10/325)  1 ea  Q4H  PRN


 ORAL


 Moderate Pain (Pain Scale 4-6)  7/6/17 16:30


 7/13/17 16:29  7/7/17 08:17


 


 


 Acetaminophen/


 Hydrocodone Bitart


  (Norco 5/325)  1 tab  Q4H  PRN


 ORAL


 Mild Pain (Pain Scale 1-3)  7/6/17 16:30


 7/13/17 16:29   


 


 


 Al Hydroxide/Mg


 Hydroxide


  (Mylanta II)  30 ml  Q6H  PRN


 ORAL


 dyspepsia  7/6/17 16:30


 8/5/17 16:29   


 


 


 Dextrose


  (Dextrose 50%)    STAT  PRN


 IV


 Hypoglycemia  7/6/17 16:30


 8/5/17 16:29   


 


 


 Docusate Sodium


  (Colace)  100 mg  TWICE A  DAY


 ORAL


   7/6/17 18:00


 8/5/17 17:59  7/7/17 08:15


 


 


 Insulin Aspart


  (NovoLOG)    BEFORE MEALS AND  HS


 SUBQ


   7/6/17 16:30


 8/5/17 16:29  7/7/17 11:28


 


 


 Lorazepam


  (Ativan 2mg/ml


 1ml)  0.5 mg  Q4H  PRN


 IV


 For Anxiety  7/6/17 16:30


 7/13/17 16:29   


 


 


 Magnesium


 Hydroxide


  (Mom)  30 ml  BIDPRN  PRN


 ORAL


 Constipation  7/6/17 16:30


 8/5/17 16:29   


 


 


 Ondansetron HCl


  (Zofran)  4 mg  Q6H  PRN


 IVP


 Nausea & Vomiting  7/6/17 16:30


 8/5/17 16:29   


 


 


 Polyethylene


 Glycol


  (Miralax)  17 gm  HSPRN  PRN


 ORAL


 Constipation  7/6/17 21:00


 8/5/17 20:59   


 


 


 Sennosides


  (Senokot)  8.6 mg  BIDPRN  PRN


 ORAL


 Constipation  7/6/17 16:30


 8/5/17 16:29   


 


 


 Zolpidem Tartrate


  (Ambien)  5 mg  HSPRN  PRN


 ORAL


 Insomnia  7/6/17 21:00


 8/5/17 20:59   


 

















YESENIA MELARA Jul 7, 2017 11:46

## 2017-07-07 NOTE — PULMONOLOGY PROGRESS NOTE
Assessment/Plan


Problems:  


(1) Lung mass


(2) Recurrent pleural effusion on right


Assessment/Plan


tolerated thoracoscopy very well


two chest tubes in place.


transfer out of ICU


pain management


pathology reviewed.





Subjective


Interval Events:  late note for 7/6


Allergies:  


Coded Allergies:  


     PENICILLINS (Unverified  Allergy, Unknown, 7/3/17)


Uncoded Allergies:  


     IV CONTRAST (Allergy, Mild, Hives, 7/4/17)





Objective





Last 24 Hour Vital Signs








  Date Time  Temp Pulse Resp B/P Pulse Ox O2 Delivery O2 Flow Rate FiO2


 


7/7/17 11:31 97.8 112 20 116/73 94 Room Air  


 


7/7/17 08:08 99.0 100 20 107/68 96 Nasal Cannula 2.0 


 


7/7/17 07:47  98      


 


7/7/17 04:00  107      


 


7/7/17 03:52 99.0 101 20 108/70 96 Nasal Cannula 2.0 


 


7/7/17 00:00  99      


 


7/7/17 00:00 98.3 102 20 112/73 99 Nasal Cannula 2.0 


 


7/6/17 20:00  119      


 


7/6/17 20:00 98.2 105 20 112/73 97 Nasal Cannula 2.0 


 


7/6/17 19:40     99 Nasal Cannula 2.0 28


 


7/6/17 19:40      Nasal Cannula 2.0 28


 


7/6/17 18:57 97.2       


 


7/6/17 17:23 97.2       


 


7/6/17 16:00  117      


 


7/6/17 16:00 99.7 110 18 108/63 96 Nasal Cannula 2.0 


 


7/6/17 15:00  108 20 106/67 98 Nasal Cannula 2.0 


 


7/6/17 14:00  112 22 109/75 98 Nasal Cannula 2.0 


 


7/6/17 13:19  76 20  98   


 


7/6/17 13:00  110 22 110/69 98 Nasal Cannula 2.0 


 


7/6/17 12:45 98.9       


 


7/6/17 12:00  109      


 


7/6/17 12:00 98.9 109 24 112/72 97 Nasal Cannula 2.0 

















Intake and Output  


 


 7/6/17 7/7/17





 19:00 07:00


 


Intake Total 540 ml 341.6 ml


 


Output Total 400 ml 1230 ml


 


Balance 140 ml -888.4 ml


 


  


 


Intake Oral 500 ml 200 ml


 


IV Total 40 ml 141.6 ml


 


Output Urine Total 230 ml 1000 ml


 


Chest Tube Drainage Total 170 ml 230 ml


 


# Voids 3 4








General Appearance:  WD/WN


HEENT:  normocephalic, atraumatic


Respiratory/Chest:  chest wall non-tender, lungs clear


Cardiovascular:  normal peripheral pulses, normal rate


Abdomen:  normal bowel sounds, soft, non tender


Genitourinary:  normal external genitalia


Extremities:  no cyanosis


Skin:  no rash


Neurologic/Psychiatric:  CNs II-XII grossly normal, no motor/sensory deficits


Lymphatic:  no neck adenopathy


Laboratory Tests


7/7/17 04:20: 


White Blood Count 18.9H, Red Blood Count 4.31, Hemoglobin 11.7L, Hematocrit 

35.4L, Mean Corpuscular Volume 82, Mean Corpuscular Hemoglobin 27.2, Mean 

Corpuscular Hemoglobin Concent 33.1, Red Cell Distribution Width 12.2, Platelet 

Count 356, Mean Platelet Volume 7.2, Neutrophils (%) (Auto) , Lymphocytes (%) (

Auto) , Monocytes (%) (Auto) , Eosinophils (%) (Auto) , Basophils (%) (Auto) , 

Differential Total Cells Counted 100, Neutrophils % (Manual) 80H, Lymphocytes % 

(Manual) 9L, Monocytes % (Manual) 11H, Eosinophils % (Manual) 0, Basophils % (

Manual) 0, Band Neutrophils 0, Platelet Estimate Adequate, Platelet Morphology 

Normal, Hypochromasia 1+, Sodium Level 130L, Potassium Level 4.3, Chloride 

Level 95L, Carbon Dioxide Level 27, Anion Gap 8, Blood Urea Nitrogen 12, 

Creatinine 0.6, Estimat Glomerular Filtration Rate > 60, Glucose Level 151H, 

Calcium Level 9.2, Total Bilirubin 0.3, Aspartate Amino Transf (AST/SGOT) 17, 

Alanine Aminotransferase (ALT/SGPT) 15, Alkaline Phosphatase 57, Pro-B-Type 

Natriuretic Peptide 56, Total Protein 6.1L, Albumin 2.9L, Globulin 3.2, Albumin/

Globulin Ratio 0.9L





Current Medications








 Medications


  (Trade)  Dose


 Ordered  Sig/Elian


 Route


 PRN Reason  Start Time


 Stop Time Status Last Admin


Dose Admin


 


 Acetaminophen


  (Tylenol)  650 mg  Q4H  PRN


 ORAL


 T>100.5  7/6/17 16:30


 8/5/17 16:29   


 


 


 Acetaminophen/


 Hydrocodone Bitart


  (Norco 10/325)  1 ea  Q4H  PRN


 ORAL


 Moderate Pain (Pain Scale 4-6)  7/6/17 16:30


 7/13/17 16:29  7/7/17 08:17


 


 


 Acetaminophen/


 Hydrocodone Bitart


  (Norco 5/325)  1 tab  Q4H  PRN


 ORAL


 Mild Pain (Pain Scale 1-3)  7/6/17 16:30


 7/13/17 16:29   


 


 


 Al Hydroxide/Mg


 Hydroxide


  (Mylanta II)  30 ml  Q6H  PRN


 ORAL


 dyspepsia  7/6/17 16:30


 8/5/17 16:29   


 


 


 Dextrose


  (Dextrose 50%)    STAT  PRN


 IV


 Hypoglycemia  7/6/17 16:30


 8/5/17 16:29   


 


 


 Docusate Sodium


  (Colace)  100 mg  TWICE A  DAY


 ORAL


   7/6/17 18:00


 8/5/17 17:59  7/7/17 08:15


 


 


 Insulin Aspart


  (NovoLOG)    BEFORE MEALS AND  HS


 SUBQ


   7/6/17 16:30


 8/5/17 16:29  7/7/17 11:28


 


 


 Lorazepam


  (Ativan 2mg/ml


 1ml)  0.5 mg  Q4H  PRN


 IV


 For Anxiety  7/6/17 16:30


 7/13/17 16:29   


 


 


 Magnesium


 Hydroxide


  (Mom)  30 ml  BIDPRN  PRN


 ORAL


 Constipation  7/6/17 16:30


 8/5/17 16:29   


 


 


 Ondansetron HCl


  (Zofran)  4 mg  Q6H  PRN


 IVP


 Nausea & Vomiting  7/6/17 16:30


 8/5/17 16:29   


 


 


 Polyethylene


 Glycol


  (Miralax)  17 gm  HSPRN  PRN


 ORAL


 Constipation  7/6/17 21:00


 8/5/17 20:59   


 


 


 Sennosides


  (Senokot)  8.6 mg  BIDPRN  PRN


 ORAL


 Constipation  7/6/17 16:30


 8/5/17 16:29   


 


 


 Zolpidem Tartrate


  (Ambien)  5 mg  HSPRN  PRN


 ORAL


 Insomnia  7/6/17 21:00


 8/5/17 20:59   


 

















YESENIA MELARA Jul 7, 2017 11:42

## 2017-07-08 NOTE — CONSULTATION
DATE OF CONSULTATION:  2017



HEMATOLOGY/ONCOLOGY CONSULTATION NOTE



CONSULTING PHYSICIAN:  Black Velasquez M.D.



REQUESTING PHYSICIAN:  Otis Quintanilla M.D.



REASON FOR CONSULTATION:  Management of a newly diagnosed lung

cancer.



IDENTIFICATION DATA:  Dear Dr. Otis Quintanilla,



The patient is a pleasant 58-year-old female, status post postop VATS on

the right side, newly diagnosed lung cancer, right upper lobe lung mass,

pleural effusion, and prior pleural cytology concerning for malignancy.

Apparently, the patient has a followup with outpatient Oncology.  ID

consult requested for postoperative leukocytosis as well as surgery

service and oncology service for evaluation of the lung cancer.  She has

been receiving IV antibiotics.



PAST MEDICAL HISTORY:

1. Lung cancer status post pleural effusion and drainage.

2. Asthma.

3. Diabetes mellitus.

4. Hypertension.



PAST SURGICAL HISTORY:  Liposuction in  and  including

cosmetic surgery tummy tuck.



HOME MEDICATIONS:  Januvia, metformin, and antihypertensives.



ALLERGIES:  Penicillin _____.



FAMILY HISTORY:  Father  from car accident.  Mother murdered.

She lives with her  and daughter and works as a .



SOCIAL HISTORY:  Negative for tobacco, alcohol, or illicit drug

use.



REVIEW OF SYSTEMS:  Constitutional:  No fever, chills, or night

sweats.  Skin:  No rashes, lumps, or itching.  HEENT:  No headache,

hearing, or vision changes.  Breasts:  No lumps, pain, or discharge.

Pulmonary:  No cough, sputum or shortness of breath.  Cardiovascular:  No

chest pain, tightness, or palpitations.  Gastrointestinal:  No nausea,

vomiting, or diarrhea.  Genitourinary:  No dysuria, frequency, or urgency.

Musculoskeletal:  No joint swelling, muscle pain, or trauma.  Neurologic:

No dizziness, fainting, or seizures.



PHYSICAL EXAMINATION:

GENERAL:  The patient is in no acute distress.

VITAL SIGNS:  Blood pressure 116/73, pulse oximetry 94% on room air,

respiratory rate 12, and O2 sat 97% on room air.

PULMONARY:  Decreased breath sounds.

CARDIOVASCULAR:  Regular rate.  No S3 or S4.

ABDOMEN:  Soft, nontender, and nondistended.

EXTREMITIES:  There is 1+ edema.



LABORATORY DATA:  WBC 18.9, hemoglobin 11.7, hematocrit 35, and

platelet count 256,000.



ASSESSMENT:

1. Stage IV lung cancer.  Pleural effusion, positive for malignant

cells.  CAT scan back in 2017 revealed a mass in the right upper lobe

measuring 4.2 x 4.8 cm with lesion surrounding infiltrates.  CAT scan of

the abdomen and pelvis was performed.  No _____ noted.  The patient has a

followup with outpatient oncologist, name is unknown as per the patient's

insurance authorization.

2. Pleural effusion status post drainage and video-assisted

thoracoscopic surgery.

3. Anemia secondary to chronic disease.

4. Leukocytosis, likely secondary to underlying malignancy and

inflammatory process.

5. Penicillin allergy.

6. History of multiple cosmetic surgeries.

7. Diabetes mellitus.

8. Hypertension.

9. _____.



I greatly appreciate consultation for Dr. Otis Quintanilla.









  ______________________________________________

  Black Velasquez M.D.





DR:  SHEFALI

D:  2017 19:17

T:  2017 10:00

JOB#:  [id number]

CC:

## 2017-07-08 NOTE — INFECTIOUS DISEASES PROG NOTE
Assessment/Plan


Assessment/Plan


ASSESSMENT:  59 y/o  female POD#3 for R VATS with talc pleurodesis for 

malignant pleural effusion  with post operative leukocytosis that continues 

downtrending to 13.6 today.  Has completed routine 3 doses postoperative 

cefazolin, off abx for >24 hrs, remains afebrile.    her leukocytosis is 

consistent with local inflammatory reaction d/t talc.  





//post op leukocytosis


//R pleural effusion, malignant


//s/p perioperative antibiotics











PLAN:  


Monitor off of antibiotics


monitor CBC daily


monitor T curve


monitor resp status





Subjective


Constitutional:  Reports: no symptoms


Skin:  Reports: no symptoms


Allergies:  


Coded Allergies:  


     PENICILLINS (Unverified  Allergy, Unknown, 7/3/17)


Uncoded Allergies:  


     IV CONTRAST (Allergy, Mild, Hives, 7/4/17)





Objective


Vital Signs





Last 24 Hour Vital Signs








  Date Time  Temp Pulse Resp B/P Pulse Ox O2 Delivery O2 Flow Rate FiO2


 


7/8/17 12:00 98.0 88 18 100/60 94 Room Air  


 


7/8/17 09:48 99.0       


 


7/8/17 08:00 98.2 101 18 96/61 91 Room Air  


 


7/8/17 04:00 99.0 100 18 106/64 91 Room Air  


 


7/8/17 00:00 99.0 98 18 106/65 91 Room Air  


 


7/7/17 20:00 99.0 103 18 109/68 95 Room Air  


 


7/7/17 19:40     97 Nasal Cannula 2.0 28


 


7/7/17 19:40      Nasal Cannula 2.0 28


 


7/7/17 15:32  120      








Height (Feet):  5


Height (Inches):  3.00


Weight (Pounds):  142


General Appearance:  no acute distress


HEENT:  anicteric


Respiratory/Chest:  other - stable decreased breath sounds at R lung base


Cardiovascular:  regular rhythm


Abdomen:  normal bowel sounds


Extremities:  no edema


Skin:  no rash





Laboratory Tests








Test


  7/8/17


06:47


 


White Blood Count


  13.6 K/UL


(4.8-10.8)  H


 


Red Blood Count


  3.73 M/UL


(4.20-5.40)  L


 


Hemoglobin


  10.2 G/DL


(12.0-16.0)  L


 


Hematocrit


  30.7 %


(37.0-47.0)  L


 


Mean Corpuscular Volume 82 FL (80-99)  


 


Mean Corpuscular Hemoglobin


  27.2 PG


(27.0-31.0)


 


Mean Corpuscular Hemoglobin


Concent 33.1 G/DL


(32.0-36.0)


 


Red Cell Distribution Width


  12.6 %


(11.6-14.8)


 


Platelet Count


  320 K/UL


(150-450)


 


Mean Platelet Volume


  7.2 FL


(6.5-10.1)


 


Neutrophils (%) (Auto)


  75.4 %


(45.0-75.0)  H


 


Lymphocytes (%) (Auto)


  11.6 %


(20.0-45.0)  L


 


Monocytes (%) (Auto)


  9.6 %


(1.0-10.0)


 


Eosinophils (%) (Auto)


  3.0 %


(0.0-3.0)


 


Basophils (%) (Auto)


  0.5 %


(0.0-2.0)


 


Sodium Level


  132 mEQ/L


(135-145)  L


 


Potassium Level


  3.9 mEQ/L


(3.4-4.9)


 


Chloride Level


  95 mEQ/L


()  L


 


Carbon Dioxide Level


  25 mEQ/L


(20-30)


 


Anion Gap 12 (5-15)  


 


Blood Urea Nitrogen


  11 mg/dL


(7-23)


 


Creatinine


  0.5 mg/dL


(0.5-0.9)


 


Estimat Glomerular Filtration


Rate > 60 mL/min


(>60)


 


Glucose Level


  144 mg/dL


()  H


 


Calcium Level


  8.9 mg/dL


(8.6-10.2)


 


Total Bilirubin


  < 0.2 mg/dL


(0.0-1.2)


 


Aspartate Amino Transf


(AST/SGOT) 16 U/L (5-40)  


 


 


Alanine Aminotransferase


(ALT/SGPT) 14 U/L (3-33)  


 


 


Alkaline Phosphatase


  58 U/L


()


 


Pro-B-Type Natriuretic Peptide


  48 pg/mL


(0-125)


 


Total Protein


  5.7 g/dL


(6.6-8.7)  L


 


Albumin


  2.7 g/dL


(3.5-5.2)  L


 


Globulin 3.0 g/dL  


 


Albumin/Globulin Ratio


  0.9 (1.0-2.7)


L











Current Medications








 Medications


  (Trade)  Dose


 Ordered  Sig/Elian


 Route


 PRN Reason  Start Time


 Stop Time Status Last Admin


Dose Admin


 


 Acetaminophen


  (Tylenol)  650 mg  Q4H  PRN


 ORAL


 T>100.5  7/7/17 18:15


 8/6/17 18:14   


 


 


 Acetaminophen/


 Hydrocodone Bitart


  (Norco 10/325)  1 ea  Q4H  PRN


 ORAL


 Moderate Pain (Pain Scale 4-6)  7/7/17 18:15


 7/14/17 18:14  7/8/17 12:48


 


 


 Acetaminophen/


 Hydrocodone Bitart


  (Norco 5/325)  1 tab  Q4H  PRN


 ORAL


 Mild Pain (Pain Scale 1-3)  7/7/17 18:15


 7/14/17 18:14   


 


 


 Al Hydroxide/Mg


 Hydroxide


  (Mylanta II)  30 ml  Q6H  PRN


 ORAL


 dyspepsia  7/7/17 18:15


 8/6/17 18:14   


 


 


 Dextrose


  (Dextrose 50%)    STAT  PRN


 IV


 Hypoglycemia  7/7/17 18:15


 8/6/17 18:14   


 


 


 Docusate Sodium


  (Colace)  100 mg  TWICE A  DAY


 ORAL


   7/8/17 09:00


 8/7/17 08:59  7/8/17 08:49


 


 


 Insulin Aspart


  (NovoLOG)    BEFORE MEALS AND  HS


 SUBQ


   7/7/17 21:00


 8/6/17 20:59  7/8/17 12:50


 


 


 Lidocaine


  (Xylocaine 1%


 MPF 5ml)  10 ml  Q4H  PRN


 HHN


 cough  7/8/17 08:15


 8/7/17 08:14   


 


 


 Lorazepam


  (Ativan 2mg/ml


 1ml)  0.5 mg  Q4H  PRN


 IV


 For Anxiety  7/7/17 18:15


 7/14/17 18:14   


 


 


 Magnesium


 Hydroxide


  (Mom)  30 ml  BIDPRN  PRN


 ORAL


 Constipation  7/7/17 18:15


 8/6/17 18:14   


 


 


 Ondansetron HCl


  (Zofran)  4 mg  Q6H  PRN


 IVP


 Nausea & Vomiting  7/7/17 18:15


 8/6/17 18:14   


 


 


 Polyethylene


 Glycol


  (Miralax)  17 gm  HSPRN  PRN


 ORAL


 Constipation  7/7/17 21:00


 8/6/17 20:59   


 


 


 Promethazine HCl/


 Codeine


  (Phenergan with


 Codeine)  5 ml  Q3H  PRN


 ORAL


 For Cough  7/8/17 08:15


 8/7/17 08:14  7/8/17 12:48


 


 


 Sennosides


  (Senokot)  8.6 mg  BIDPRN  PRN


 ORAL


 Constipation  7/8/17 16:30


 8/7/17 16:29   


 


 


 Zolpidem Tartrate


  (Ambien)  5 mg  HSPRN  PRN


 ORAL


 Insomnia  7/7/17 21:00


 8/6/17 20:59   


 

















Surjit Dale M.D. Jul 8, 2017 13:54

## 2017-07-08 NOTE — PULMONOLOGY PROGRESS NOTE
Assessment/Plan


Problems:  


(1) Lung mass


(2) Recurrent pleural effusion on right


Assessment/Plan


increase phenergen


add lidocain inhalation


chest tube removed


med/surg


pain management





dc planning





Subjective


ROS Limited/Unobtainable:  No


Respiratory:  Reports: productive cough, shortness of breath


Allergies:  


Coded Allergies:  


     PENICILLINS (Unverified  Allergy, Unknown, 7/3/17)


Uncoded Allergies:  


     IV CONTRAST (Allergy, Mild, Hives, 7/4/17)





Objective





Last 24 Hour Vital Signs








  Date Time  Temp Pulse Resp B/P Pulse Ox O2 Delivery O2 Flow Rate FiO2


 


7/8/17 17:45 98.0       


 


7/8/17 12:00 98.0 88 18 100/60 94 Room Air  


 


7/8/17 08:02      Nasal Cannula 2.0 


 


7/8/17 08:00 98.2 101 18 96/61 91 Room Air  


 


7/8/17 08:00     98 Nasal Cannula 2.0 


 


7/8/17 04:00 99.0 100 18 106/64 91 Room Air  


 


7/8/17 00:00 99.0 98 18 106/65 91 Room Air  


 


7/7/17 20:00 99.0 103 18 109/68 95 Room Air  


 


7/7/17 19:40     97 Nasal Cannula 2.0 28


 


7/7/17 19:40      Nasal Cannula 2.0 28

















Intake and Output  


 


 7/7/17 7/8/17





 19:00 07:00


 


Intake Total 300 ml 


 


Balance 300 ml 


 


  


 


Intake Oral 300 ml 


 


# Voids 3 1








General Appearance:  WD/WN


HEENT:  normocephalic, atraumatic


Respiratory/Chest:  chest wall non-tender, lungs clear


Cardiovascular:  normal peripheral pulses, normal rate


Abdomen:  normal bowel sounds, soft, non tender


Genitourinary:  normal external genitalia


Extremities:  no clubbing


Skin:  no rash


Laboratory Tests


7/8/17 06:47: 


White Blood Count 13.6H, Red Blood Count 3.73L, Hemoglobin 10.2L, Hematocrit 

30.7L, Mean Corpuscular Volume 82, Mean Corpuscular Hemoglobin 27.2, Mean 

Corpuscular Hemoglobin Concent 33.1, Red Cell Distribution Width 12.6, Platelet 

Count 320, Mean Platelet Volume 7.2, Neutrophils (%) (Auto) 75.4H, Lymphocytes (

%) (Auto) 11.6L, Monocytes (%) (Auto) 9.6, Eosinophils (%) (Auto) 3.0, 

Basophils (%) (Auto) 0.5, Sodium Level 132L, Potassium Level 3.9, Chloride 

Level 95L, Carbon Dioxide Level 25, Anion Gap 12, Blood Urea Nitrogen 11, 

Creatinine 0.5, Estimat Glomerular Filtration Rate > 60, Glucose Level 144H, 

Calcium Level 8.9, Total Bilirubin < 0.2, Aspartate Amino Transf (AST/SGOT) 16, 

Alanine Aminotransferase (ALT/SGPT) 14, Alkaline Phosphatase 58, Pro-B-Type 

Natriuretic Peptide 48, Total Protein 5.7L, Albumin 2.7L, Globulin 3.0, Albumin/

Globulin Ratio 0.9L





Current Medications








 Medications


  (Trade)  Dose


 Ordered  Sig/Elian


 Route


 PRN Reason  Start Time


 Stop Time Status Last Admin


Dose Admin


 


 Acetaminophen


  (Tylenol)  650 mg  Q4H  PRN


 ORAL


 T>100.5  7/7/17 18:15


 8/6/17 18:14   


 


 


 Acetaminophen/


 Hydrocodone Bitart


  (Norco 10/325)  1 ea  Q4H  PRN


 ORAL


 Moderate Pain (Pain Scale 4-6)  7/7/17 18:15


 7/14/17 18:14  7/8/17 16:46


 


 


 Acetaminophen/


 Hydrocodone Bitart


  (Norco 5/325)  1 tab  Q4H  PRN


 ORAL


 Mild Pain (Pain Scale 1-3)  7/7/17 18:15


 7/14/17 18:14   


 


 


 Al Hydroxide/Mg


 Hydroxide


  (Mylanta II)  30 ml  Q6H  PRN


 ORAL


 dyspepsia  7/7/17 18:15


 8/6/17 18:14   


 


 


 Dextrose


  (Dextrose 50%)    STAT  PRN


 IV


 Hypoglycemia  7/7/17 18:15


 8/6/17 18:14   


 


 


 Docusate Sodium


  (Colace)  100 mg  TWICE A  DAY


 ORAL


   7/8/17 09:00


 8/7/17 08:59  7/8/17 17:05


 


 


 Insulin Aspart


  (NovoLOG)    BEFORE MEALS AND  HS


 SUBQ


   7/7/17 21:00


 8/6/17 20:59  7/8/17 17:07


 


 


 Lidocaine


  (Xylocaine 1%


 MPF 5ml)  10 ml  Q4H  PRN


 HHN


 cough  7/8/17 08:15


 8/7/17 08:14   


 


 


 Lorazepam


  (Ativan 2mg/ml


 1ml)  0.5 mg  Q4H  PRN


 IV


 For Anxiety  7/7/17 18:15


 7/14/17 18:14   


 


 


 Magnesium


 Hydroxide


  (Mom)  30 ml  BIDPRN  PRN


 ORAL


 Constipation  7/7/17 18:15


 8/6/17 18:14   


 


 


 Ondansetron HCl


  (Zofran)  4 mg  Q6H  PRN


 IVP


 Nausea & Vomiting  7/7/17 18:15


 8/6/17 18:14   


 


 


 Polyethylene


 Glycol


  (Miralax)  17 gm  HSPRN  PRN


 ORAL


 Constipation  7/7/17 21:00


 8/6/17 20:59   


 


 


 Promethazine HCl/


 Codeine


  (Phenergan with


 Codeine)  5 ml  Q3H  PRN


 ORAL


 For Cough  7/8/17 08:15


 8/7/17 08:14  7/8/17 16:24


 


 


 Sennosides


  (Senokot)  8.6 mg  BIDPRN  PRN


 ORAL


 Constipation  7/8/17 16:30


 8/7/17 16:29   


 


 


 Zolpidem Tartrate


  (Ambien)  5 mg  HSPRN  PRN


 ORAL


 Insomnia  7/7/17 21:00


 8/6/17 20:59   


 

















YESENIA MELARA Jul 8, 2017 19:11

## 2017-07-08 NOTE — DIAGNOSTIC IMAGING REPORT
Indications:  Shortness breath, pleural effusion



Technique:  Portable AP chest



Findings:



Comparison:  7/7/17



Indwelling right chest tube has fallen into the basal aspect of the right pleural

space. Circumscribed lucency persists over the lateral right lung base. Moderate

right pleural effusion, cardiomegaly, bilateral interstitial infiltrates,, 

bilateral

subsegmental atelectasis versus scarring persists, unchanged. No new 

abnormality

identified.



IMPRESSION:



Change in position of right chest tube



Suggestion of stable loculated right basal pneumothorax



Stable right pleural effusion, bilateral congestive changes, bilateral 

subsegmental

atelectasis versus scarring

## 2017-07-09 NOTE — CONSULTATION
Consult Note


Consult Note





asked to eval for low Na


Patient interviewed and examined


data reviewed


Na 135 down to 129


Assessment/Plan





Suspect SIADH





Others:





- Stage IV lung cancer.  Pleural effusion, positive for malignant


cells.  CAT scan back in June 2017 revealed a mass in the right upper lobe


measuring 4.2 x 4.8 cm with lesion surrounding infiltrates.  


- Pleural effusion status post drainage and video-assisted


thoracoscopic surgery.


-  Anemia secondary to chronic disease.


-  Leukocytosis, likely secondary to underlying malignancy and


inflammatory process.


-  Penicillin allergy.


-  History of multiple cosmetic surgeries.


-  Diabetes mellitus.


-  Hypertension.





Sugg:





Urine Na and Os


Serum Os and Uric Acid


further comments based on above results.











YAKELIN HERRERA Jul 9, 2017 10:47

## 2017-07-09 NOTE — GENERAL PROGRESS NOTE
Assessment/Plan


Assessment/Plan


ASSESSMENT:


1. Stage IV lung cancer.  Pleural effusion, positive for malignant cells.  CAT 

scan back in June 2017 revealed a mass in the right upper lobe measuring 4.2 x 

4.8 cm with lesion surrounding infiltrates.  CAT scan of the abdomen and pelvis 

was performed. No metastases were seen. 


---> she has a followup with outpatient oncologist, name is unknown as per the 

patient's insurance authorization/contract


2. Pleural effusion status post drainage and video-assisted thoracoscopic 

surgery.


3. Anemia secondary to chronic disease.


4. Leukocytosis, likely secondary to underlying malignancy and inflammatory 

process.


5. PCN allergy.


6. History of multiple cosmetic surgeries.


7. Diabetes mellitus.


8. Hypertension.





Subjective


Constitutional:  Denies: chills, diaphoresis, fever, malaise, no symptoms, other

, weakness


HEENT:  Denies: blurred vision, double vision, ear discharge, ear pain, eye pain

, mouth pain, mouth swelling, no symptoms, nose congestion, nose pain, other, 

tearing, throat pain, throat swelling


Cardiovascular:  Denies: chest pain, edema, irregular heart rate, 

lightheadedness, no symptoms, other, palpitations, syncope


Respiratory:  Denies: SOB at rest, SOB with excertion, cough, no symptoms, 

orthopnea, other, shortness of breath, sputum, stridor, wheezing


Gastrointestinal/Abdominal:  Denies: abdomen distended, abdominal pain, black 

stools, blood in stool, constipated, diarrhea, difficulty swallowing, nausea, 

no symptoms, other, poor appetite, poor fluid intake, rectal bleeding, tarry 

stools, vomiting


Genitourinary:  Denies: burning, discharge, flank pain, frequency, hematuria, 

incontinence, no symptoms, other, pain, urgency


Neurologic/Psychiatric:  Denies: anxiety, depressed, emotional problems, 

headache, no symptoms, numbness, other, paresthesia, pre-existing deficit, 

seizure, tingling, tremors, weakness


Endocrine:  Denies: excessive sweating, flushing, increased hunger, increased 

thirst, increased urine, intolerance to cold, intolerance to heat, no symptoms, 

other, unexplained weight gain, unexplained weight loss


Hematologic/Lymphatic:  Denies: anemia, easy bleeding, easy bruising, no 

symptoms, other


Allergies:  


Coded Allergies:  


     PENICILLINS (Unverified  Allergy, Unknown, 7/3/17)


Uncoded Allergies:  


     IV CONTRAST (Allergy, Mild, Hives, 7/4/17)


Subjective


stable, no events to report, no fevers or chills noted





Objective





Last 24 Hour Vital Signs








  Date Time  Temp Pulse Resp B/P Pulse Ox O2 Delivery O2 Flow Rate FiO2


 


7/9/17 20:00 99.9 98 18 109/69 96 Nasal Cannula 2.0 


 


7/9/17 16:00 97.2 118 20 110/67 97 Room Air  


 


7/9/17 12:00 98.2 90 18 110/65  Room Air  


 


7/9/17 04:00 96.8 92 20 102/62 100 Nasal Cannula 2.0 


 


7/9/17 00:00 98.1 90 20 108/69 94 Nasal Cannula 2.0 

















Intake and Output  


 


 7/8/17 7/9/17





 19:00 07:00


 


Intake Total 540 ml 


 


Balance 540 ml 


 


  


 


Intake Oral 540 ml 


 


# Voids 3 1








Laboratory Tests


7/9/17 06:05: 


White Blood Count 10.5, Red Blood Count 3.64L, Hemoglobin 9.8L, Hematocrit 30.2L

, Mean Corpuscular Volume 83, Mean Corpuscular Hemoglobin 27.1, Mean 

Corpuscular Hemoglobin Concent 32.6, Red Cell Distribution Width 12.6, Platelet 

Count 366, Mean Platelet Volume 6.9, Neutrophils (%) (Auto) 70.5, Lymphocytes (%

) (Auto) 16.1L, Monocytes (%) (Auto) 8.3, Eosinophils (%) (Auto) 4.5H, 

Basophils (%) (Auto) 0.7, Sodium Level 129L, Potassium Level 4.6, Chloride 

Level 92L, Carbon Dioxide Level 29, Anion Gap 8, Blood Urea Nitrogen 9, 

Creatinine 0.6, Estimat Glomerular Filtration Rate > 60, Glucose Level 144H, 

Hemoglobin A1c 7.3H, Plasma/Serum Osmolality [Pending], Uric Acid 4.8, Calcium 

Level 9.0, Iron Level 18L, Total Iron Binding Capacity 167L, Percent Iron 

Saturation 11L, Unsaturated Iron Binding 149, Ferritin 198H, Vitamin B12 Level 

1985H, Folate [Pending]


7/9/17 12:41: 


Urine Osmolality [Pending], Urine Random Sodium 11


Height (Feet):  5


Height (Inches):  3.00


Weight (Pounds):  152


General Appearance:  alert


EENT:  TMs normal


Neck:  normal inspection


Cardiovascular:  normal rate, regular rhythm


Respiratory/Chest:  normal breath sounds


Abdomen:  non tender


Extremities:  non-tender











Black Velasquez Jul 9, 2017 23:46

## 2017-07-09 NOTE — PULMONOLOGY PROGRESS NOTE
Assessment/Plan


Problems:  


(1) Lung mass


(2) Recurrent pleural effusion on right


Assessment/Plan


cough better


add lidocain inhalation


chest tube removed


med/surg


pain management





dc planning





Subjective


ROS Limited/Unobtainable:  No


Interval Events:  less cough


Allergies:  


Coded Allergies:  


     PENICILLINS (Unverified  Allergy, Unknown, 7/3/17)


Uncoded Allergies:  


     IV CONTRAST (Allergy, Mild, Hives, 7/4/17)





Objective





Last 24 Hour Vital Signs








  Date Time  Temp Pulse Resp B/P Pulse Ox O2 Delivery O2 Flow Rate FiO2


 


7/9/17 12:00 98.2 90 18 110/65  Room Air  


 


7/9/17 04:00 96.8 92 20 102/62 100 Nasal Cannula 2.0 


 


7/9/17 00:00 98.1 90 20 108/69 94 Nasal Cannula 2.0 


 


7/8/17 20:22     99 Nasal Cannula 2.0 


 


7/8/17 20:22      Nasal Cannula 2.0 


 


7/8/17 20:00 98.8 68 20 105/72 97 Nasal Cannula 2.0 


 


7/8/17 17:45 98.0       

















Intake and Output  


 


 7/8/17 7/9/17





 19:00 07:00


 


Intake Total 540 ml 


 


Balance 540 ml 


 


  


 


Intake Oral 540 ml 


 


# Voids 3 1








General Appearance:  cachetic


HEENT:  normocephalic, atraumatic


Respiratory/Chest:  chest wall non-tender, lungs clear


Cardiovascular:  normal peripheral pulses, normal rate


Abdomen:  normal bowel sounds, soft, non tender


Genitourinary:  normal external genitalia


Extremities:  no cyanosis


Skin:  no rash, no lesions


Neurologic/Psychiatric:  CNs II-XII grossly normal, no motor/sensory deficits


Lymphatic:  no neck adenopathy


Musculoskeletal:  no effusion


Laboratory Tests


7/9/17 06:05: 


White Blood Count 10.5, Red Blood Count 3.64L, Hemoglobin 9.8L, Hematocrit 30.2L

, Mean Corpuscular Volume 83, Mean Corpuscular Hemoglobin 27.1, Mean 

Corpuscular Hemoglobin Concent 32.6, Red Cell Distribution Width 12.6, Platelet 

Count 366, Mean Platelet Volume 6.9, Neutrophils (%) (Auto) 70.5, Lymphocytes (%

) (Auto) 16.1L, Monocytes (%) (Auto) 8.3, Eosinophils (%) (Auto) 4.5H, 

Basophils (%) (Auto) 0.7, Sodium Level 129L, Potassium Level 4.6, Chloride 

Level 92L, Carbon Dioxide Level 29, Anion Gap 8, Blood Urea Nitrogen 9, 

Creatinine 0.6, Estimat Glomerular Filtration Rate > 60, Glucose Level 144H, 

Hemoglobin A1c 7.3H, Plasma/Serum Osmolality [Pending], Uric Acid 4.8, Calcium 

Level 9.0, Iron Level 18L, Total Iron Binding Capacity 167L, Percent Iron 

Saturation 11L, Unsaturated Iron Binding 149, Ferritin 198H, Vitamin B12 Level 

1985H, Folate [Pending]


7/9/17 12:41: 


Urine Osmolality [Pending], Urine Random Sodium 11





Current Medications








 Medications


  (Trade)  Dose


 Ordered  Sig/Elian


 Route


 PRN Reason  Start Time


 Stop Time Status Last Admin


Dose Admin


 


 Acetaminophen


  (Tylenol)  650 mg  Q4H  PRN


 ORAL


 T>100.5  7/7/17 18:15


 8/6/17 18:14   


 


 


 Acetaminophen/


 Hydrocodone Bitart


  (Norco 10/325)  1 ea  Q4H  PRN


 ORAL


 Moderate Pain (Pain Scale 4-6)  7/7/17 18:15


 7/14/17 18:14  7/9/17 14:47


 


 


 Acetaminophen/


 Hydrocodone Bitart


  (Norco 5/325)  1 tab  Q4H  PRN


 ORAL


 Mild Pain (Pain Scale 1-3)  7/7/17 18:15


 7/14/17 18:14   


 


 


 Al Hydroxide/Mg


 Hydroxide


  (Mylanta II)  30 ml  Q6H  PRN


 ORAL


 dyspepsia  7/7/17 18:15


 8/6/17 18:14   


 


 


 Dextrose


  (Dextrose 50%)    STAT  PRN


 IV


 Hypoglycemia  7/7/17 18:15


 8/6/17 18:14   


 


 


 Diphenhydramine


 HCl


  (Benadryl)  35 mg  Q6H  PRN


 IVP


 Itching  7/8/17 22:00


 8/7/17 21:59  7/8/17 22:23


 


 


 Docusate Sodium


  (Colace)  100 mg  TWICE A  DAY


 ORAL


   7/8/17 09:00


 8/7/17 08:59  7/9/17 09:38


 


 


 Insulin Aspart


  (NovoLOG)    BEFORE MEALS AND  HS


 SUBQ


   7/7/17 21:00


 8/6/17 20:59  7/9/17 12:15


 


 


 Lactulose


  (Cephulac)  20 gm  FOUR TIMES A  DAY


 ORAL


   7/9/17 18:00


 8/8/17 17:59   


 


 


 Lidocaine


  (Xylocaine 1%


 MPF 5ml)  10 ml  Q4H  PRN


 HHN


 cough  7/8/17 08:15


 8/7/17 08:14   


 


 


 Lorazepam


  (Ativan 2mg/ml


 1ml)  0.5 mg  Q4H  PRN


 IV


 For Anxiety  7/7/17 18:15


 7/14/17 18:14   


 


 


 Magnesium


 Hydroxide


  (Mom)  30 ml  BIDPRN  PRN


 ORAL


 Constipation  7/7/17 18:15


 8/6/17 18:14  7/9/17 06:17


 


 


 Ondansetron HCl


  (Zofran)  4 mg  Q6H  PRN


 IVP


 Nausea & Vomiting  7/7/17 18:15


 8/6/17 18:14   


 


 


 Polyethylene


 Glycol


  (Miralax)  17 gm  HSPRN  PRN


 ORAL


 Constipation  7/7/17 21:00


 8/6/17 20:59   


 


 


 Promethazine HCl/


 Codeine


  (Phenergan with


 Codeine)  5 ml  Q3H  PRN


 ORAL


 For Cough  7/8/17 08:15


 8/7/17 08:14  7/9/17 09:37


 


 


 Sennosides


  (Senokot)  8.6 mg  BIDPRN  PRN


 ORAL


 Constipation  7/8/17 16:30


 8/7/17 16:29  7/9/17 09:38


 


 


 Sennosides


  (Senokot)  8.6 mg  DAILY


 ORAL


   7/9/17 15:00


 8/8/17 14:59  7/9/17 14:56


 


 


 Zolpidem Tartrate


  (Ambien)  5 mg  HSPRN  PRN


 ORAL


 Insomnia  7/7/17 21:00


 8/6/17 20:59   


 

















YESENIA MELARA Jul 9, 2017 15:37

## 2017-07-09 NOTE — GENERAL PROGRESS NOTE
Assessment/Plan


Assessment/Plan


ASSESSMENT:


1. Stage IV lung cancer.  Pleural effusion, positive for malignant cells.  CAT 

scan back in June 2017 revealed a mass in the right upper lobe measuring 4.2 x 

4.8 cm with lesion surrounding infiltrates.  CAT scan of the abdomen and pelvis 

was performed. No metastases were seen. 


The patient has a followup with outpatient oncologist, name is unknown as per 

the patient's insurance authorization/contract


2. Pleural effusion status post drainage and video-assisted thoracoscopic 

surgery.


3. Anemia secondary to chronic disease.


4. Leukocytosis, likely secondary to underlying malignancy and inflammatory 

process.


5. PCN allergy.


6. History of multiple cosmetic surgeries.


7. Diabetes mellitus.


8. Hypertension.





Subjective


Date patient seen:  Jul 8, 2017


Constitutional:  Reports: no symptoms


HEENT:  Reports: no symptoms


Cardiovascular:  Reports: no symptoms


Respiratory:  Reports: no symptoms


Gastrointestinal/Abdominal:  Reports: no symptoms


Genitourinary:  Reports: no symptoms


Neurologic/Psychiatric:  Reports: no symptoms


Endocrine:  Reports: no symptoms


Hematologic/Lymphatic:  Reports: anemia


Allergies:  


Coded Allergies:  


     PENICILLINS (Unverified  Allergy, Unknown, 7/3/17)


Uncoded Allergies:  


     IV CONTRAST (Allergy, Mild, Hives, 7/4/17)


Subjective


stable, no events to report, no fevers or chills





Objective





Last 24 Hour Vital Signs








  Date Time  Temp Pulse Resp B/P Pulse Ox O2 Delivery O2 Flow Rate FiO2


 


7/9/17 04:00 96.8 92 20 102/62 100 Nasal Cannula 2.0 


 


7/9/17 00:00 98.1 90 20 108/69 94 Nasal Cannula 2.0 


 


7/8/17 20:22     99 Nasal Cannula 2.0 


 


7/8/17 20:22      Nasal Cannula 2.0 


 


7/8/17 20:00 98.8 68 20 105/72 97 Nasal Cannula 2.0 


 


7/8/17 17:45 98.0       


 


7/8/17 12:00 98.0 88 18 100/60 94 Room Air  

















Intake and Output  


 


 7/8/17 7/9/17





 19:00 07:00


 


Intake Total 540 ml 


 


Balance 540 ml 


 


  


 


Intake Oral 540 ml 


 


# Voids 3 1








Laboratory Tests


7/9/17 06:05: 


White Blood Count 10.5, Red Blood Count 3.64L, Hemoglobin 9.8L, Hematocrit 30.2L

, Mean Corpuscular Volume 83, Mean Corpuscular Hemoglobin 27.1, Mean 

Corpuscular Hemoglobin Concent 32.6, Red Cell Distribution Width 12.6, Platelet 

Count 366, Mean Platelet Volume 6.9, Neutrophils (%) (Auto) 70.5, Lymphocytes (%

) (Auto) 16.1L, Monocytes (%) (Auto) 8.3, Eosinophils (%) (Auto) 4.5H, 

Basophils (%) (Auto) 0.7, Sodium Level 129L, Potassium Level 4.6, Chloride 

Level 92L, Carbon Dioxide Level 29, Anion Gap 8, Blood Urea Nitrogen 9, 

Creatinine 0.6, Estimat Glomerular Filtration Rate > 60, Glucose Level 144H, 

Hemoglobin A1c 7.3H, Plasma/Serum Osmolality [Pending], Uric Acid 4.8, Calcium 

Level 9.0, Iron Level [Pending], Unsaturated Iron Binding [Pending], Ferritin [

Pending], Vitamin B12 Level [Pending], Folate [Pending]


Height (Feet):  5


Height (Inches):  3.00


Weight (Pounds):  152


General Appearance:  no apparent distress


EENT:  pharynx normal


Neck:  supple


Cardiovascular:  regular rhythm


Respiratory/Chest:  normal breath sounds


Abdomen:  non tender


Extremities:  normal range of motion


Edema:  no edema noted Leg (L), no edema noted Leg (R)


Edema:  mild edema


Neurologic:  alert


Skin:  warm/dry











Black Velasquez Jul 9, 2017 11:47

## 2017-07-09 NOTE — INFECTIOUS DISEASES PROG NOTE
Assessment/Plan


Assessment/Plan


ASSESSMENT:  59 y/o  female POD#4 for R VATS with talc pleurodesis for 

malignant pleural effusion  with post operative leukocytosis that steadily 

decreased from 23.4 to normal today.  Has completed routine 3 doses 

postoperative cefazolin, off abx for >48 hrs, remains afebrile.    her 

leukocytosis is consistent with local inflammatory reaction d/t talc, and is 

now resolved.  





//post op leukocytosis


//R pleural effusion, malignant


//s/p perioperative antibiotics











PLAN:  


Monitor off of antibiotics


monitor T curve


monitor resp status





Subjective


Constitutional:  Reports: no symptoms


HEENT:  Reports: no symptoms


Gastrointestinal/Abdominal:  Reports: no symptoms


Genitourinary:  Reports: no symptoms


Allergies:  


Coded Allergies:  


     PENICILLINS (Unverified  Allergy, Unknown, 7/3/17)


Uncoded Allergies:  


     IV CONTRAST (Allergy, Mild, Hives, 7/4/17)





Objective


Vital Signs





Last 24 Hour Vital Signs








  Date Time  Temp Pulse Resp B/P Pulse Ox O2 Delivery O2 Flow Rate FiO2


 


7/9/17 12:00 98.2 90 18 110/65  Room Air  


 


7/9/17 04:00 96.8 92 20 102/62 100 Nasal Cannula 2.0 


 


7/9/17 00:00 98.1 90 20 108/69 94 Nasal Cannula 2.0 


 


7/8/17 20:22     99 Nasal Cannula 2.0 


 


7/8/17 20:22      Nasal Cannula 2.0 


 


7/8/17 20:00 98.8 68 20 105/72 97 Nasal Cannula 2.0 


 


7/8/17 17:45 98.0       








Height (Feet):  5


Height (Inches):  3.00


Weight (Pounds):  152


General Appearance:  no acute distress


HEENT:  anicteric


Respiratory/Chest:  decreased breath sounds, other - stable decreased breath 

sounds on the right s/p chest tube remova.


Cardiovascular:  normal rate, regular rhythm


Abdomen:  normal bowel sounds


Skin:  no rash





Laboratory Tests








Test


  7/9/17


06:05 7/9/17


12:41


 


White Blood Count


  10.5 K/UL


(4.8-10.8) 


 


 


Red Blood Count


  3.64 M/UL


(4.20-5.40)  L 


 


 


Hemoglobin


  9.8 G/DL


(12.0-16.0)  L 


 


 


Hematocrit


  30.2 %


(37.0-47.0)  L 


 


 


Mean Corpuscular Volume 83 FL (80-99)   


 


Mean Corpuscular Hemoglobin


  27.1 PG


(27.0-31.0) 


 


 


Mean Corpuscular Hemoglobin


Concent 32.6 G/DL


(32.0-36.0) 


 


 


Red Cell Distribution Width


  12.6 %


(11.6-14.8) 


 


 


Platelet Count


  366 K/UL


(150-450) 


 


 


Mean Platelet Volume


  6.9 FL


(6.5-10.1) 


 


 


Neutrophils (%) (Auto)


  70.5 %


(45.0-75.0) 


 


 


Lymphocytes (%) (Auto)


  16.1 %


(20.0-45.0)  L 


 


 


Monocytes (%) (Auto)


  8.3 %


(1.0-10.0) 


 


 


Eosinophils (%) (Auto)


  4.5 %


(0.0-3.0)  H 


 


 


Basophils (%) (Auto)


  0.7 %


(0.0-2.0) 


 


 


Sodium Level


  129 mEQ/L


(135-145)  L 


 


 


Potassium Level


  4.6 mEQ/L


(3.4-4.9) 


 


 


Chloride Level


  92 mEQ/L


()  L 


 


 


Carbon Dioxide Level


  29 mEQ/L


(20-30) 


 


 


Anion Gap 8 (5-15)   


 


Blood Urea Nitrogen


  9 mg/dL (7-23)


  


 


 


Creatinine


  0.6 mg/dL


(0.5-0.9) 


 


 


Estimat Glomerular Filtration


Rate > 60 mL/min


(>60) 


 


 


Glucose Level


  144 mg/dL


()  H 


 


 


Hemoglobin A1c


  7.3 % (< 6.0)


H 


 


 


Plasma/Serum Osmolality Pending   


 


Uric Acid


  4.8 mg/dL


(3.0-7.5) 


 


 


Calcium Level


  9.0 mg/dL


(8.6-10.2) 


 


 


Iron Level


  18 ug/dL


()  L 


 


 


Total Iron Binding Capacity


  167 ug/dL


(250-400)  L 


 


 


Percent Iron Saturation 11 % (15-50)  L 


 


Unsaturated Iron Binding


  149 ug/dL


(112-346) 


 


 


Ferritin


  198 ng/mL


()  H 


 


 


Vitamin B12 Level


  1985 pg/mL


(211-946)  H 


 


 


Folate Pending   


 


Urine Osmolality  Pending  


 


Urine Random Sodium  11 mmol/L  











Current Medications








 Medications


  (Trade)  Dose


 Ordered  Sig/Elian


 Route


 PRN Reason  Start Time


 Stop Time Status Last Admin


Dose Admin


 


 Acetaminophen


  (Tylenol)  650 mg  Q4H  PRN


 ORAL


 T>100.5  7/7/17 18:15


 8/6/17 18:14   


 


 


 Acetaminophen/


 Hydrocodone Bitart


  (Norco 10/325)  1 ea  Q4H  PRN


 ORAL


 Moderate Pain (Pain Scale 4-6)  7/7/17 18:15


 7/14/17 18:14  7/9/17 14:47


 


 


 Acetaminophen/


 Hydrocodone Bitart


  (Norco 5/325)  1 tab  Q4H  PRN


 ORAL


 Mild Pain (Pain Scale 1-3)  7/7/17 18:15


 7/14/17 18:14   


 


 


 Al Hydroxide/Mg


 Hydroxide


  (Mylanta II)  30 ml  Q6H  PRN


 ORAL


 dyspepsia  7/7/17 18:15


 8/6/17 18:14   


 


 


 Dextrose


  (Dextrose 50%)    STAT  PRN


 IV


 Hypoglycemia  7/7/17 18:15


 8/6/17 18:14   


 


 


 Diphenhydramine


 HCl


  (Benadryl)  35 mg  Q6H  PRN


 IVP


 Itching  7/8/17 22:00


 8/7/17 21:59  7/8/17 22:23


 


 


 Docusate Sodium


  (Colace)  100 mg  TWICE A  DAY


 ORAL


   7/8/17 09:00


 8/7/17 08:59  7/9/17 09:38


 


 


 Insulin Aspart


  (NovoLOG)    BEFORE MEALS AND  HS


 SUBQ


   7/7/17 21:00


 8/6/17 20:59  7/9/17 12:15


 


 


 Lactulose


  (Cephulac)  20 gm  FOUR TIMES A  DAY


 ORAL


   7/9/17 18:00


 8/8/17 17:59   


 


 


 Lidocaine


  (Xylocaine 1%


 MPF 5ml)  10 ml  Q4H  PRN


 HHN


 cough  7/8/17 08:15


 8/7/17 08:14   


 


 


 Lorazepam


  (Ativan 2mg/ml


 1ml)  0.5 mg  Q4H  PRN


 IV


 For Anxiety  7/7/17 18:15


 7/14/17 18:14   


 


 


 Magnesium


 Hydroxide


  (Mom)  30 ml  BIDPRN  PRN


 ORAL


 Constipation  7/7/17 18:15


 8/6/17 18:14  7/9/17 06:17


 


 


 Ondansetron HCl


  (Zofran)  4 mg  Q6H  PRN


 IVP


 Nausea & Vomiting  7/7/17 18:15


 8/6/17 18:14   


 


 


 Polyethylene


 Glycol


  (Miralax)  17 gm  HSPRN  PRN


 ORAL


 Constipation  7/7/17 21:00


 8/6/17 20:59   


 


 


 Promethazine HCl/


 Codeine


  (Phenergan with


 Codeine)  5 ml  Q3H  PRN


 ORAL


 For Cough  7/8/17 08:15


 8/7/17 08:14  7/9/17 09:37


 


 


 Sennosides


  (Senokot)  8.6 mg  BIDPRN  PRN


 ORAL


 Constipation  7/8/17 16:30


 8/7/17 16:29  7/9/17 09:38


 


 


 Sennosides


  (Senokot)  8.6 mg  DAILY


 ORAL


   7/9/17 15:00


 8/8/17 14:59   


 


 


 Zolpidem Tartrate


  (Ambien)  5 mg  HSPRN  PRN


 ORAL


 Insomnia  7/7/17 21:00


 8/6/17 20:59   


 

















Surjit Dale M.D. Jul 9, 2017 14:53

## 2017-07-09 NOTE — BRIEF OPERATIVE NOTE
Immediate Post Operative Note


Operative Note


Pre-op Diagnosis:


Recurrent right pleural effusion


Procedure:


R VATS


Post-op Diagnosis:


same


Post-op Diagnosis:  same as pre-op


Surgeon:  Jeremiah


Anesthesia:  general


Specimen:  yes


Complications:  none


Estimated Blood Loss:  minimal


Implant(s) used?:  No











TANMAY GRAHAM M.D. Jul 9, 2017 13:58

## 2017-07-10 NOTE — PULMONOLOGY PROGRESS NOTE
Assessment/Plan


Problems:  


(1) Lung mass


(2) Recurrent pleural effusion on right


Assessment/Plan


cough better


add lidocain inhalation


chest tube removed


med/surg


pain management





dc planning





Subjective


ROS Limited/Unobtainable:  No


Constitutional:  Reports: no symptoms


HEENT:  Repors: no symptoms


Respiratory:  Reports: no symptoms


Allergies:  


Coded Allergies:  


     PENICILLINS (Unverified  Allergy, Unknown, 7/3/17)


Uncoded Allergies:  


     IV CONTRAST (Allergy, Mild, Hives, 7/4/17)





Objective





Last 24 Hour Vital Signs








  Date Time  Temp Pulse Resp B/P Pulse Ox O2 Delivery O2 Flow Rate FiO2


 


7/10/17 12:18 98.2 97 19 111/66 99 Nasal Cannula 2.0 


 


7/10/17 11:27 99.0       


 


7/10/17 07:56 99.0 92 20 120/82 98 Nasal Cannula 2.0 


 


7/10/17 07:16      Nasal Cannula 2.0 28


 


7/10/17 07:15     99 Nasal Cannula 2.0 28


 


7/10/17 04:00 97.5 90 16 129/73 98 Nasal Cannula 2.0 


 


7/10/17 03:42     99 Nasal Cannula 2.0 28


 


7/10/17 03:42      Nasal Cannula 2.0 28


 


7/10/17 00:00 97.2 97 16 113/64 96 Nasal Cannula 2.0 


 


7/9/17 20:00 99.9 98 18 109/69 96 Nasal Cannula 2.0 


 


7/9/17 16:00 97.2 118 20 110/67 97 Room Air  

















Intake and Output  


 


 7/9/17 7/10/17





 19:00 07:00


 


Intake Total 840 ml 440 ml


 


Balance 840 ml 440 ml


 


  


 


Intake Oral 840 ml 200 ml


 


IV Total  240 ml


 


# Voids 4 3


 


# Bowel Movements 1 4








Objective


General Appearance:  WD/WN


HEENT:  normocephalic


Respiratory/Chest:  chest wall non-tender, lungs clear


Cardiovascular:  normal peripheral pulses, normal rate


Abdomen:  normal bowel sounds, soft, non tender


Extremities:  no cyanosis, other - effusion in both knees


Skin:  no rash


Laboratory Tests


7/10/17 05:30: 


Sodium Level 135, Potassium Level 4.4, Chloride Level 97L, Carbon Dioxide Level 

29, Anion Gap 9, Blood Urea Nitrogen 8, Creatinine 0.4L, Estimat Glomerular 

Filtration Rate > 60, Glucose Level 163H, Uric Acid 4.2, Calcium Level 9.1, 

Phosphorus Level 5.0H, Magnesium Level 2.3, Total Bilirubin 0.2, Aspartate 

Amino Transf (AST/SGOT) 903H, Alanine Aminotransferase (ALT/SGPT) 841H, 

Alkaline Phosphatase 248H, Total Protein 6.0L, Albumin 2.7L, Globulin 3.3, 

Albumin/Globulin Ratio 0.8L


7/10/17 09:45: 


Sodium Level 132L, Potassium Level 4.6, Chloride Level 94L, Carbon Dioxide 

Level 30, Anion Gap 8, Blood Urea Nitrogen 9, Creatinine 0.4L, Estimat 

Glomerular Filtration Rate > 60, Glucose Level 139H, Calcium Level 9.2, Total 

Bilirubin 0.2, Aspartate Amino Transf (AST/SGOT) 702H, Alanine Aminotransferase 

(ALT/SGPT) 787H, Alkaline Phosphatase 242H, Total Protein 6.2L, Albumin 2.9L, 

Globulin 3.3, Albumin/Globulin Ratio 0.8L





Current Medications








 Medications


  (Trade)  Dose


 Ordered  Sig/Elian


 Route


 PRN Reason  Start Time


 Stop Time Status Last Admin


Dose Admin


 


 Acetaminophen


  (Tylenol)  650 mg  Q4H  PRN


 ORAL


 T>100.5  7/7/17 18:15


 8/6/17 18:14   


 


 


 Acetaminophen/


 Hydrocodone Bitart


  (Norco 10/325)  1 ea  Q4H  PRN


 ORAL


 Moderate Pain (Pain Scale 4-6)  7/7/17 18:15


 7/14/17 18:14  7/10/17 13:32


 


 


 Acetaminophen/


 Hydrocodone Bitart


  (Norco 5/325)  1 tab  Q4H  PRN


 ORAL


 Mild Pain (Pain Scale 1-3)  7/7/17 18:15


 7/14/17 18:14   


 


 


 Al Hydroxide/Mg


 Hydroxide


  (Mylanta II)  30 ml  Q6H  PRN


 ORAL


 dyspepsia  7/7/17 18:15


 8/6/17 18:14   


 


 


 Dextrose


  (Dextrose 50%)    STAT  PRN


 IV


 Hypoglycemia  7/7/17 18:15


 8/6/17 18:14   


 


 


 Diphenhydramine


 HCl


  (Benadryl)  35 mg  Q6H  PRN


 IVP


 Itching  7/8/17 22:00


 8/7/17 21:59  7/8/17 22:23


 


 


 Docusate Sodium


  (Colace)  100 mg  TWICE A  DAY


 ORAL


   7/8/17 09:00


 8/7/17 08:59  7/10/17 08:51


 


 


 Insulin Aspart


  (NovoLOG)    BEFORE MEALS AND  HS


 SUBQ


   7/7/17 21:00


 8/6/17 20:59  7/10/17 06:18


 


 


 Lactulose


  (Cephulac)  20 gm  FOUR TIMES A  DAY


 ORAL


   7/9/17 18:00


 8/8/17 17:59  7/9/17 20:49


 


 


 Lidocaine


  (Xylocaine 1%


 MPF 5ml)  10 ml  Q4H  PRN


 HHN


 cough  7/8/17 08:15


 8/7/17 08:14   


 


 


 Lorazepam


  (Ativan 2mg/ml


 1ml)  0.5 mg  Q4H  PRN


 IV


 For Anxiety  7/7/17 18:15


 7/14/17 18:14   


 


 


 Magnesium


 Hydroxide


  (Mom)  30 ml  BIDPRN  PRN


 ORAL


 Constipation  7/7/17 18:15


 8/6/17 18:14  7/9/17 06:17


 


 


 Morphine Sulfate


  (Morphine


 Sulfate)  2 mg  ONCE  ONCE


 IVP


   7/10/17 15:00


 7/10/17 15:01   


 


 


 Morphine Sulfate


  (Morphine


 Sulfate)  2 mg  Q4H  PRN


 IVP


 Severe Pain (Pain Scale 7-10)  7/10/17 13:30


 7/17/17 13:29   


 


 


 Ondansetron HCl


  (Zofran)  4 mg  Q6H  PRN


 IVP


 Nausea & Vomiting  7/7/17 18:15


 8/6/17 18:14   


 


 


 Polyethylene


 Glycol


  (Miralax)  17 gm  HSPRN  PRN


 ORAL


 Constipation  7/7/17 21:00


 8/6/17 20:59   


 


 


 Promethazine HCl/


 Codeine


  (Phenergan with


 Codeine)  5 ml  Q3H  PRN


 ORAL


 For Cough  7/8/17 08:15


 8/7/17 08:14  7/10/17 09:27


 


 


 Sennosides


  (Senokot)  8.6 mg  BIDPRN  PRN


 ORAL


 Constipation  7/8/17 16:30


 8/7/17 16:29  7/9/17 09:38


 


 


 Sennosides


  (Senokot)  8.6 mg  DAILY


 ORAL


   7/9/17 15:00


 8/8/17 14:59  7/10/17 08:51


 


 


 Zolpidem Tartrate


  (Ambien)  5 mg  HSPRN  PRN


 ORAL


 Insomnia  7/7/17 21:00


 8/6/17 20:59   


 

















YESENIA MELARA Jul 10, 2017 14:55

## 2017-07-10 NOTE — INFECTIOUS DISEASES PROG NOTE
Assessment/Plan


Assessment/Plan


ASSESSMENT:  57 y/o  female POD#5 for R VATS with talc pleurodesis for 

malignant pleural effusion  with post operative leukocytosis that steadily 

decreased from 23.4 to normal yesterday.  Has completed routine 3 doses 

postoperative cefazolin, off abx for >72 hrs, remains afebrile.    her 

leukocytosis is consistent with local inflammatory reaction d/t talc, and is 

now resolved.  





//post op leukocytosis


//R pleural effusion, malignant


//s/p perioperative antibiotics











PLAN:  


Monitor off of antibiotics


monitor T curve


monitor resp status


okay for discharge from ID standpoint once her post-surgical care is concluded.





Subjective


Constitutional:  Reports: no symptoms


Gastrointestinal/Abdominal:  Reports: no symptoms


Allergies:  


Coded Allergies:  


     PENICILLINS (Unverified  Allergy, Unknown, 7/3/17)


Uncoded Allergies:  


     IV CONTRAST (Allergy, Mild, Hives, 7/4/17)





Objective


Vital Signs





Last 24 Hour Vital Signs








  Date Time  Temp Pulse Resp B/P Pulse Ox O2 Delivery O2 Flow Rate FiO2


 


7/10/17 16:15 98.0 89 19 115/74 98 Nasal Cannula 2.0 


 


7/10/17 15:02 98.2       


 


7/10/17 12:18 98.2 97 19 111/66 99 Nasal Cannula 2.0 


 


7/10/17 07:56 99.0 92 20 120/82 98 Nasal Cannula 2.0 


 


7/10/17 07:16      Nasal Cannula 2.0 28


 


7/10/17 07:15     99 Nasal Cannula 2.0 28


 


7/10/17 04:00 97.5 90 16 129/73 98 Nasal Cannula 2.0 


 


7/10/17 03:42     99 Nasal Cannula 2.0 28


 


7/10/17 03:42      Nasal Cannula 2.0 28


 


7/10/17 00:00 97.2 97 16 113/64 96 Nasal Cannula 2.0 


 


7/9/17 20:00 99.9 98 18 109/69 96 Nasal Cannula 2.0 








Height (Feet):  5


Height (Inches):  3.00


Weight (Pounds):  155


General Appearance:  no acute distress


HEENT:  atraumatic, anicteric


Respiratory/Chest:  other - exam unchanged, stable decreased breath sounds at R 

lung base


Cardiovascular:  normal rate, regular rhythm


Abdomen:  no organomegaly


Extremities:  no cyanosis, no clubbing, no edema


Skin:  no rash





Laboratory Tests








Test


  7/10/17


05:30 7/10/17


09:45


 


Sodium Level


  135 mEQ/L


(135-145) 132 mEQ/L


(135-145)  L


 


Potassium Level


  4.4 mEQ/L


(3.4-4.9) 4.6 mEQ/L


(3.4-4.9)


 


Chloride Level


  97 mEQ/L


()  L 94 mEQ/L


()  L


 


Carbon Dioxide Level


  29 mEQ/L


(20-30) 30 mEQ/L


(20-30)


 


Anion Gap 9 (5-15)   8 (5-15)  


 


Blood Urea Nitrogen


  8 mg/dL (7-23)


  9 mg/dL (7-23)


 


 


Creatinine


  0.4 mg/dL


(0.5-0.9)  L 0.4 mg/dL


(0.5-0.9)  L


 


Estimat Glomerular Filtration


Rate > 60 mL/min


(>60) > 60 mL/min


(>60)


 


Glucose Level


  163 mg/dL


()  H 139 mg/dL


()  H


 


Uric Acid


  4.2 mg/dL


(3.0-7.5) 


 


 


Calcium Level


  9.1 mg/dL


(8.6-10.2) 9.2 mg/dL


(8.6-10.2)


 


Phosphorus Level


  5.0 mg/dL


(2.5-4.8)  H 


 


 


Magnesium Level


  2.3 mg/dL


(1.7-2.5) 


 


 


Total Bilirubin


  0.2 mg/dL


(0.0-1.2) 0.2 mg/dL


(0.0-1.2)


 


Aspartate Amino Transf


(AST/SGOT) 903 U/L (5-40)


H 702 U/L (5-40)


H


 


Alanine Aminotransferase


(ALT/SGPT) 841 U/L (3-33)


H 787 U/L (3-33)


H


 


Alkaline Phosphatase


  248 U/L


()  H 242 U/L


()  H


 


Total Protein


  6.0 g/dL


(6.6-8.7)  L 6.2 g/dL


(6.6-8.7)  L


 


Albumin


  2.7 g/dL


(3.5-5.2)  L 2.9 g/dL


(3.5-5.2)  L


 


Globulin 3.3 g/dL   3.3 g/dL  


 


Albumin/Globulin Ratio


  0.8 (1.0-2.7)


L 0.8 (1.0-2.7)


L











Current Medications








 Medications


  (Trade)  Dose


 Ordered  Sig/Elian


 Route


 PRN Reason  Start Time


 Stop Time Status Last Admin


Dose Admin


 


 Acetaminophen


  (Tylenol)  650 mg  Q4H  PRN


 ORAL


 T>100.5  7/7/17 18:15


 8/6/17 18:14   


 


 


 Acetaminophen/


 Hydrocodone Bitart


  (Norco 10/325)  1 ea  Q4H  PRN


 ORAL


 Moderate Pain (Pain Scale 4-6)  7/7/17 18:15


 7/14/17 18:14  7/10/17 13:32


 


 


 Acetaminophen/


 Hydrocodone Bitart


  (Norco 5/325)  1 tab  Q4H  PRN


 ORAL


 Mild Pain (Pain Scale 1-3)  7/7/17 18:15


 7/14/17 18:14   


 


 


 Al Hydroxide/Mg


 Hydroxide


  (Mylanta II)  30 ml  Q6H  PRN


 ORAL


 dyspepsia  7/7/17 18:15


 8/6/17 18:14   


 


 


 Dextrose


  (Dextrose 50%)    STAT  PRN


 IV


 Hypoglycemia  7/7/17 18:15


 8/6/17 18:14   


 


 


 Diphenhydramine


 HCl


  (Benadryl)  35 mg  Q6H  PRN


 IVP


 Itching  7/8/17 22:00


 8/7/17 21:59  7/8/17 22:23


 


 


 Docusate Sodium


  (Colace)  100 mg  TWICE A  DAY


 ORAL


   7/8/17 09:00


 8/7/17 08:59  7/10/17 08:51


 


 


 Insulin Aspart


  (NovoLOG)    BEFORE MEALS AND  HS


 SUBQ


   7/7/17 21:00


 8/6/17 20:59  7/10/17 06:18


 


 


 Lactulose


  (Cephulac)  20 gm  FOUR TIMES A  DAY


 ORAL


   7/9/17 18:00


 8/8/17 17:59  7/9/17 20:49


 


 


 Lidocaine


  (Xylocaine 1%


 MPF 5ml)  10 ml  Q4H  PRN


 HHN


 cough  7/8/17 08:15


 8/7/17 08:14   


 


 


 Lorazepam


  (Ativan 2mg/ml


 1ml)  0.5 mg  Q4H  PRN


 IV


 For Anxiety  7/7/17 18:15


 7/14/17 18:14   


 


 


 Magnesium


 Hydroxide


  (Mom)  30 ml  BIDPRN  PRN


 ORAL


 Constipation  7/7/17 18:15


 8/6/17 18:14  7/9/17 06:17


 


 


 Morphine Sulfate


  (Morphine


 Sulfate)  2 mg  Q4H  PRN


 IVP


 Severe Pain (Pain Scale 7-10)  7/10/17 13:30


 7/17/17 13:29   


 


 


 Ondansetron HCl


  (Zofran)  4 mg  Q6H  PRN


 IVP


 Nausea & Vomiting  7/7/17 18:15


 8/6/17 18:14   


 


 


 Polyethylene


 Glycol


  (Miralax)  17 gm  HSPRN  PRN


 ORAL


 Constipation  7/7/17 21:00


 8/6/17 20:59   


 


 


 Promethazine HCl/


 Codeine


  (Phenergan with


 Codeine)  5 ml  Q3H  PRN


 ORAL


 For Cough  7/8/17 08:15


 8/7/17 08:14  7/10/17 15:08


 


 


 Sennosides


  (Senokot)  8.6 mg  BIDPRN  PRN


 ORAL


 Constipation  7/8/17 16:30


 8/7/17 16:29  7/9/17 09:38


 


 


 Sennosides


  (Senokot)  8.6 mg  DAILY


 ORAL


   7/9/17 15:00


 8/8/17 14:59  7/10/17 08:51


 


 


 Zolpidem Tartrate


  (Ambien)  5 mg  HSPRN  PRN


 ORAL


 Insomnia  7/7/17 21:00


 8/6/17 20:59   


 

















Surjit Dale M.D. Jul 10, 2017 16:24

## 2017-07-10 NOTE — DIAGNOSTIC IMAGING REPORT
Indications:  Pleural effusion, shortness of breath



Technique:  Portable AP chest



Findings:



Comparison:  7/8/2017



Right chest tube remains in place. Focal lucency persists over lateral basal aspect

right hemithorax, possible small loculated pneumothorax. Right pleural 

effusion,

bilateral interstitial infiltrates and subsegmental atelectasis versus scarring

persists, unchanged. No new abnormality identified.



IMPRESSION:



No significant change from one day prior

## 2017-07-10 NOTE — GENERAL PROGRESS NOTE
Assessment/Plan


Status:  stable


Status Narrative


Na jorge 135


Assessment/Plan


Low Na , depletional- resolved with 3% saline





Others:





- Stage IV lung cancer.  Pleural effusion, positive for malignant


cells.  CAT scan back in June 2017 revealed a mass in the right upper lobe


measuring 4.2 x 4.8 cm with lesion surrounding infiltrates.  


- Pleural effusion status post drainage and video-assisted


thoracoscopic surgery.


-  Anemia secondary to chronic disease.


-  Leukocytosis, likely secondary to underlying malignancy and


inflammatory process.


-  Penicillin allergy.


-  History of multiple cosmetic surgeries.


-  Diabetes mellitus.


-  Hypertension.





Sugg:





Urine Na and Os


Serum Os and Uric Acid


further comments based on above results.





Subjective


ROS Limited/Unobtainable:  No


Constitutional:  Reports: malaise


Allergies:  


Coded Allergies:  


     PENICILLINS (Unverified  Allergy, Unknown, 7/3/17)


Uncoded Allergies:  


     IV CONTRAST (Allergy, Mild, Hives, 7/4/17)





Objective





Last 24 Hour Vital Signs








  Date Time  Temp Pulse Resp B/P Pulse Ox O2 Delivery O2 Flow Rate FiO2


 


7/10/17 07:56 99.0 92 20 120/82 98 Nasal Cannula 2.0 


 


7/10/17 07:16      Nasal Cannula 2.0 28


 


7/10/17 07:15     99 Nasal Cannula 2.0 28


 


7/10/17 04:00 97.5 90 16 129/73 98 Nasal Cannula 2.0 


 


7/10/17 03:42     99 Nasal Cannula 2.0 28


 


7/10/17 03:42      Nasal Cannula 2.0 28


 


7/10/17 00:00 97.2 97 16 113/64 96 Nasal Cannula 2.0 


 


7/9/17 20:00 99.9 98 18 109/69 96 Nasal Cannula 2.0 


 


7/9/17 16:00 97.2 118 20 110/67 97 Room Air  


 


7/9/17 12:00 98.2 90 18 110/65  Room Air  

















Intake and Output  


 


 7/9/17 7/10/17





 19:00 07:00


 


Intake Total 840 ml 440 ml


 


Balance 840 ml 440 ml


 


  


 


Intake Oral 840 ml 200 ml


 


IV Total  240 ml


 


# Voids 4 3


 


# Bowel Movements 1 4








Laboratory Tests


7/9/17 12:41: 


Urine Random Sodium 11, Miscellaneous Test 2 See comment


7/10/17 05:30: 


Sodium Level 135, Potassium Level 4.4, Chloride Level 97L, Carbon Dioxide Level 

29, Anion Gap 9, Blood Urea Nitrogen 8, Creatinine 0.4L, Estimat Glomerular 

Filtration Rate > 60, Glucose Level 163H, Uric Acid 4.2, Calcium Level 9.1, 

Phosphorus Level 5.0H, Magnesium Level 2.3, Total Bilirubin 0.2, Aspartate 

Amino Transf (AST/SGOT) 903H, Alanine Aminotransferase (ALT/SGPT) 841H, 

Alkaline Phosphatase 248H, Total Protein 6.0L, Albumin 2.7L, Globulin 3.3, 

Albumin/Globulin Ratio 0.8L


7/10/17 09:45: 


Sodium Level 132L, Potassium Level 4.6, Chloride Level 94L, Carbon Dioxide 

Level 30, Anion Gap 8, Blood Urea Nitrogen 9, Creatinine 0.4L, Estimat 

Glomerular Filtration Rate > 60, Glucose Level 139H, Calcium Level 9.2, Total 

Bilirubin 0.2, Aspartate Amino Transf (AST/SGOT) 702H, Alanine Aminotransferase 

(ALT/SGPT) 787H, Alkaline Phosphatase 242H, Total Protein 6.2L, Albumin 2.9L, 

Globulin 3.3, Albumin/Globulin Ratio 0.8L


Height (Feet):  5


Height (Inches):  3.00


Weight (Pounds):  155


General Appearance:  no apparent distress











YAKELIN HERRERA Jul 10, 2017 11:12

## 2017-07-11 NOTE — DISCHARGE SUMMARY
Discharge Summary


Hospital Course


Date of Admission


Jul 3, 2017 at 21:24


Date of Discharge


Jul 10, 2017 at 17:24


Admitting Diagnosis


malignant effusion


HPI


Rufina Colon is a 58 year old female who was admitted on Jul 3, 2017 at 21:24 

for Malignant Effusion


Hospital Course


dc summary #[id number]





Discharge Medications


Continued Medications:  


Amlodipine Besylate/Benazepril 5-20 Mg (Lotrel 5-20 Mg Capsule*) 1 Each Capsule


1 CAP ORAL DAILY, CAP





Atorvastatin Calcium* (Atorvastatin Calcium*) 20 Mg Tablet


5 MG ORAL BEDTIME, TAB





Codeine/Promethazine Hcl* (Promethazine-Codeine Syrup*) 118 Ml Syrup


5 ML ORAL EVERY 8 HOURS PRN for For Cough, ML 0 Refills





Hydrocodone Bit/Acetaminophen * (Norco *) 1 Each Tablet


1 TAB ORAL Q4H PRN for For Pain, TAB 0 Refills


PRN PAIN


Metformin Hcl* (Metformin Hcl*) 1,000 Mg Tablet


1000 MG ORAL DAILY, TAB





Sitagliptin* (Januvia*) 25 Mg Tablet


100 MG ORAL DAILY, TAB











Discharge


Condition Upon Discharge:  stable


Discharge Disposition


Patient was discharged to Home (01)


Discharge Diagnoses:  





Discharge Instructions


Discharge Instructions


Special Instructions


I have been assigned to complete a D/C Summary on this account. I was not 

involved in the patient management











Tram Steven NP (Vanchtein) Jul 11, 2017 11:37

## 2017-07-11 NOTE — DISCHARGE SUMMARY 2 SIG
DATE OF ADMISSION:  07/03/2017



DATE OF DISCHARGE:  07/10/2017



REASON FOR ADMISSION:   58-year-old female with a

history of diabetes, hypertension, recurrent right pleural effusion, and

right lung mass, presented for biopsy of lung.  The patient  status post

drainage of right pleural fluid effusion done at [facility].  

CT scan on 06/22/2017 revealed  lung mass in the right upper lobe, 

measuring 4.2 x 4.8 cm satellite nodule with surrounding infiltrate.  

Ultrasound of the abdomen revealed no hepatic lesion and rule out gall stones.  

Common bile duct was normal. There were no pancreatic masses.  



At this time, the patient denied any productive cough, fever, or pleuritic

chest pain.  She stated that the pain was in   lower part of the chest and 

radiated towards her back. She felt pain in the upper abdomen.  She took Norco, 

but it did not control her pain.  Pain was constant 8 to 9 on a scale 1 to 10.  

She did not lose any weight.  She denied any rashes.  She was depressed and

concerned about her potential diagnosis.  She presented to have a definite

tissue diagnosis with biopsy.  Upon presentation, the patient was afebrile

and normotensive.  Pulse oximetry was stable on room air.  Mild

leukocytosis - 11.5, hemoglobin -12, and hematocrit -36.4.  Urinalysis was

3+ positive for leukocyte esterase and few bacteria.  Renal parameters

stable.  Troponin negative.  CK- 41, proBNP -24, and lipase -19.  EKG showed

normal sinus rhythm.  Chest x-ray showed  right large pleural effusion.  The

patient was admitted for further management.



ADMITTING DIAGNOSES:  



1. Recurrent likely malignant right pleural effusion.

2. Right lung mass.



HOSPITAL STAY:  The patient was admitted.  The patient was started on

the IV fluids.  Cardiothoracic surgery consult was requested.  Pain

management was addressed.  DVT prophylaxis provided.  Venous duplex of

bilateral lower extremities was negative for acute DVT.  Cardiothoracic

surgeon seen and evaluated the patient and ordered CT of the chest,

abdomen, and pelvis to be done prior to the surgery.   

CT chest, abdomen, and pelvis revealed mass in the right upper lobe,

encompassed in the right upper lobe bronchus with volume loss in the right

lung and large pleural effusion.  In addition, nodularity was noted in the

right pleural space, which was most consistent with the primary pulmonary

neoplasm and malignant pleural effusion.  Risks and benefits of  impending

surgery were discussed with the patient.  The patient agreed to the

surgery.  Subsequently, the patient had on 07/05/2017  flexible

bronchoscopy with right video-assisted thoracoscopic surgery, intrapleural

pneumolysis, and pleurodesis.  Biopsy was obtained.  During the procedure,

pleural effusion was tapped, yielding roughly 2 liter of pleural fluid,

which was sent for the cytology.  Chest tube was placed for re-expansion

of the lung (right video-assisted thoracoscopic surgery).  Course of

recovery was uneventful.  While chest tube was in, the patient had

daily chest x-ray.  

The patient noted to be anemic.   Hematology/Oncology consult was requested.  

Anemia workup initiated.  Per Hematology/Oncology, the patient had anemia 

of chronic disease.  Hemoglobin and hematocrit closely monitored.  

No need for transfusion.

The patient has a stage IV lung cancer.  Cytology  of the pleural fluid was 

consistent with poorly differentiated lung adenocarcinoma.  

The patient needs to follow up with the oncologist as outpatient per her 
insurance.  

The patient also noted to have hyponatremia.  Hyponatremia workup initiated. 

Nephrology consult requested.  Sodium down to 129, was repleted with  
hypertonic 

3% sodium chloride and up to 135. Hyponatremia was likely depletional as per 
nephrologist.  

Pain management was addressed.  

Supplemental oxygen and pulmonary toilet provided as needed.  Antitussive given 

as needed.    Blood sugar was managed with sliding scale of insulin.  

Hemoglobin A1c- 7.3, nearly at goal.  At home, continue oral anti-glycemic 

medication.  The patient was normotensive.  No antihypertensive medication, 

while in the hospital.  After surgery, the patient developed severe 
leukocytosis 

with WBC - 23.4.  ID consult was requested.  The patient was started on empiric 

antibiotics.  According to ID doctor, acute leukocytosis was likely combination 
of 

local inflammatory reaction due to surgery in addition to   malignant process.  

Leukocytosis down to 10.6.  The patient was three days postoperatively on 
antibiotics, 

then off antibiotic for 72 hours, still afebrile, and WBC  down to normal.

ID cleared the patient for discharge.  Last hemoglobin -9.8 and hematocrit

-30.2.  The patient was cleared for discharge home  and follow up with the

primary medical doctor and oncologist for further management.



DISCHARGE DIAGNOSES:   



1. Right lung adenocarcinoma stage IV.

2. Recurrent malignant right pleural effusion.

3. Status post 07/05/2017 flexible bronchoscopy with right

video-assisted thoracoscopic surgery, intrapleural pneumolysis, and pleurodesis.

4. Hyponatremia (depletional), resolved.

5. Diabetes mellitus.

6. History of hypertension.

7. Anemia of chronic disease.



DISCHARGE MEDICATIONS:  See medication reconciliation list.



DISCHARGE INSTRUCTIONS:  The patient was discharged home.  Follow up

with the primary medical doctor and oncologist.







  ______________________________________________

  Otis Quintanilla M.D.



I have been assigned to dictate discharge summary on this account and I

was not involved in the patient's management.



  ______________________________________________

  Tram Steven (Arturojada N.PLena





DR:  NATHALIE

D:  07/11/2017 11:36

T:  07/11/2017 17:57

JOB#:  [id number]

CC:



DAISY

## 2017-10-07 NOTE — DIAGNOSTIC IMAGING REPORT
Indication: Shortness of breath



Technique: XRAY CHEST 1 V



Comparison: 7/9/17



Findings: Cardiomediastinal silhouette is within normal limits. There is right

pleural fluid or thickening. Predominantly linear opacities are seen of the right

mid and lower lung fields. The left lung is clear. Degenerative changes of the spine

are noted.



Impression:



Right pleural fluid or thickening decreased from the prior study. Improved but 

mild

residual linear opacities of the right mid and lower lung fields could 

represent

underlying atelectasis but residual infiltrate is not excluded. Clinical 

correlation

recommended.

## 2017-10-07 NOTE — CONSULTATION
DATE OF CONSULTATION:  10/06/2017



SURGEON:  Yosef Daniels M.D.



SPECIALITY:  Thoracic Surgery.



History of Present Illness:  The patient is a 58-year-old female, well

known to me for stage IV right lung cancer, who underwent right PleurX

catheter, who presented to the emergency room at [facility]

complaining of catheter insertion site redness.  Thoracic Surgery was then

consulted for further evaluation.



PAST MEDICAL HISTORY:  Notable for:



1. Hypertension.

2. Diabetes.

3. Asthma.

4. Stage IV lung cancer.



Past Surgical History:  Notable for multiple cosmetic surgery including

breast lift, tummy tuck, liposuction from  and , and a right

PleurX catheter placement in 2017.



Medications:  Includes Januvia, metformin, and antihypertensive

medication.



ALLERGIES:  The patient is known to be allergic to penicillin.



SOCIAL HISTORY:  Denies tobacco, alcohol, or illicit drug use.



Family History:  Father  from motor accident and mother was

murdered.  She is  and lives with  and daughter in

[place].  Working as a .



PHYSICAL EXAMINATION:

Vital Signs:  She is noted to be afebrile.  Her vitals are within normal

limits.

CARDIAC:  Regular rate and rhythm.  No gallops or murmur.

RESPIRATORY:  Clear to auscultation bilaterally.

Abdomen:  Soft, nondistended, and nontender with normoactive bowel sounds.

The right side of the PleurX  does show a little bit of induration without

any purulent drainage.

EXTREMITY:  No evidence of cyanosis, clubbing, or edema.



Assessment And Plan:  This is a 58-year-old female, who presented with

stage IV right lung cancer to the emergency room at [facility].  The patient was evaluated at bedside.  After reviewing her

clinical database, we will proceed to remove the PleurX catheter.  The

patient can be followed in my outpatient clinic.



I want to thank you for referring this patient to my attention.  If

there are any questions in regard to this patient's clinical care, please

do not hesitate to contact me.









  ______________________________________________

  Rader M.D.





DR:  KADE

D:  10/06/2017 17:21

T:  10/07/2017 03:12

JOB#:  [id number]

CC:

## 2017-10-09 NOTE — OPERATIVE NOTE - DICTATED
DATE OF OPERATION:  10/06/2017



SURGEON:  Yosef Daniels M.D., Thoracic Surgery.



PREOPERATIVE DIAGNOSIS:  Status post right Pleurx catheter.



PREOPERATIVE DIAGNOSIS:  Status post right Pleurx catheter.



PROCEDURE PERFORMED:

1. Removal of right Pleurx catheter.

2. Intercostal nerve block.



PROCEDURE IN DETAIL:  The patient is a 58-year-old female with a stage IV

lung cancer, who presented to the emergency room at [facility]

complaining of Pleurx catheter insertion site tenderness associated with

cellulitis.  A decision was made to remove the right Pleurx catheter.



After obtaining the informed consent, the patient was then placed in the

left lateral decubitus position and the suture was then cut.  A 20 mL of

1% lidocaine was used to perform the intercostal nerve block in the usual

fashion.  The subcutaneous _____00:49 was then dissected around until it

was free and the Pleurx catheter was then removed without any difficulty.

Occlusive dressing was applied.  The patient tolerated the procedure well

without any complications.  Currently, she is awaiting for chest x-ray.









  ______________________________________________

  Rader M.D.





DR:  KADE

D:  10/06/2017 17:22

T:  10/07/2017 03:13

JOB#:  [id number]

CC:

## 2017-10-10 NOTE — EMERGENCY ROOM REPORT
History of Present Illness


General


Chief Complaint:  Pain


Source:  Patient, Medical Record





Present Illness


HPI


58YOF walk-in with pain to right rib cage where PleurX catheter is placed.  

Also c/o redness to area.


History of Stage 4 lung cancer, HTN, DM, asthma.


PleurX was placed previously by Dr Daniels for recurrent malignant pleural effusion


Patient denies cough, fever/chills, abd pain


Allergies:  


Coded Allergies:  


     DIATRIZOATE MEGLUMINE (Verified  Allergy, Intermediate, rash, hives on face

, neck and arms, 7/14/17)


 from 7/3/17


     DIATRIZOATE SODIUM (Verified  Allergy, Intermediate, rash, hives on face, 

neck and arms, 7/14/17)


 from 7/3/17


     IODINE (Verified  Allergy, Intermediate, 7/14/17)


 from gastrografin


     IOPAMIDOL (Verified  Allergy, Intermediate, rash, hives on face, neck and 

arms, 7/14/17)


 occurance from 7/3/17. [id number]


     PENICILLINS (Unverified  Allergy, Unknown, 7/3/17)





Patient History


Past Medical History:  other - see hpi


Past Surgical History:  other - see hpi


Pertinent Family History:  none


Social History:  Denies: smoking, alcohol use, drug use


Pregnant Now:  No


Immunizations:  UTD


Reviewed Nursing Documentation:  PMH: Agreed, PSxH: Agreed





Nursing Documentation-PMH


Hx Cardiac Problems:  No - lung cancer rt side


Hx Hypertension:  Yes


Hx Asthma:  Yes


Hx Diabetes:  Yes - Type 2 oral medications


Hx Cancer:  No


Hx Gastrointestinal Problems:  No


Hx Neurological Problems:  No





Review of Systems


All Other Systems:  negative except mentioned in HPI





Physical Exam





Vital Signs








  Date Time  Temp Pulse Resp B/P (MAP) Pulse Ox O2 Delivery O2 Flow Rate FiO2


 


10/6/17 15:37 97.9 99 22 120/79 98 Room Air  








Sp02 EP Interpretation:  reviewed, normal


General Appearance:  normal inspection, well appearing, no apparent distress, 

alert


Head:  normocephalic, atraumatic


Eyes:  bilateral eye PERRL, bilateral eye EOMI


ENT:  normal ENT inspection, hearing grossly normal, normal voice


Neck:  normal inspection, full range of motion, supple, no bony tend


Respiratory:  normal inspection, lungs clear, normal breath sounds, no 

respiratory distress, no retraction, no wheezing, other - Right side of chest: 

PleurX in place. Minor erythema at site but no induration or abscess. No pus 

drainage.


Cardiovascular #1:  regular rate, rhythm, no edema


Gastrointestinal:  normal inspection, normal bowel sounds, non tender, soft, no 

guarding, no hernia


Genitourinary:  no CVA tenderness


Musculoskeletal:  normal inspection, back normal, normal range of motion, Deepti'

s Sign negative


Neurologic:  normal inspection, alert, oriented x3, responsive, CNs III-XII nml 

as tested, motor strength/tone normal


Psychiatric:  normal inspection, judgement/insight normal, mood/affect normal


Skin:  normal inspection, normal color, no rash


Lymphatic:  normal inspection





Medical Decision Making


Diagnostic Impression:  


 Primary Impression:  


 Recurrent pleural effusion on right


ER Course


VSS. Afebrile


CXR: No PTX.  Interval resolution of effusion


Dr Daniels Consulted from Cardiothoracic surgery - removed PleurX in ED


Patient given analgesia


Feels better


Will followup with Dr Daniels in office


DC home








CXR


1 view


ED review


Interval improvement from previously seen effusion; no PTX, no cardiomegaly


Dr Valery Seay MD


Chest X-Ray Diagnostic Results


Chest X-Ray Diagnostic Results :  


   Chest X-Ray Ordered:  Yes


   # of Views/Limited/Complete:  1 View


   Indication:  Other - PleurX placeemnt


   EP Interpretation:  Yes


   Interpretation:  no consolidation, no effusion, no pneumothorax, no acute 

cardiopulmonary disease


   Impression:  No acute disease


   Electronically Signed by:  Dr Valery Seay MD





Last Vital Signs








  Date Time  Temp Pulse Resp B/P (MAP) Pulse Ox O2 Delivery O2 Flow Rate FiO2


 


10/6/17 17:29  78 16 130/78 98 Room Air  


 


10/6/17 17:09 98.0       








Status:  improved


Disposition:  HOME, SELF-CARE


Condition:  Improved


Referrals:  


NON PHYSICIAN (PCP)


Patient Instructions:  Pleural Effusion





Additional Instructions:  


- Follow up with Dr. Daniels in his office as instructed











VALERY SEAY M.D. Oct 10, 2017 05:09

## 2023-11-23 NOTE — EMERGENCY ROOM REPORT
History of Present Illness


General


Chief Complaint:  General Complaint


Source:  Family Member





Present Illness


HPI


Patient here for biopsy of lung with a pleural effusion and R lung mass.  Pain 

in chest and upper abdomen.  Laboratory workup was done at [facility].

  She had the pleural effusion drained.  Cytology was not pathognomonic for 

malignant cells.  The suspicion is high however at based on the CT appearance 

of R lung mass.  The pleural fluid was negative for acid-fast bacilli.  CT scan 

on  reveals mass in the right upper lobe measuring 4.2 x 4.8 cm with 

satellite nodules with surrounding infiltrate. An ultrasound of the abdomen 

revealed no hepatic lesions and ruled out gallstones the common bile duct was 

normal.  There were no pancreatic masses.  This was done  also.





Patient denies any productive cough, fever, pleuritic chest pain.  She states 

the pain is in the lower part of her chest and radiates towards her back.  She 

also feels it in her upper abdomen.  She took Norco and this did not control 

the pain.  The pain is 8 or 9/10 and constant and aching.  She's not lost 

weight.  Denies any rashes.  She's concerned and depressed about her potential 

diagnosis.  She's here to get definitive tissue diagnosis with a biopsy.





No dysuria, hematuria, diarrhea, nausea.  Some constipation.  No extremity pain 

or headache.


Allergies:  


Coded Allergies:  


     PENICILLINS (Unverified  Allergy, Unknown, 7/3/17)


Uncoded Allergies:  


     IV CONTRAST (Allergy, Mild, Hives, 17)


     PENICILLIN (Allergy, Unknown, 7/3/17)





Patient History


Past Medical History:  see triage record


Past Surgical History:  other - thorcentesis


Social History:  Denies: smoking


Social History Narrative


born [facility]


Pregnant Now:  No


:  3


Para:  3


Reviewed Nursing Documentation:  PMH: Agreed, PSxH: Agreed





Nursing Documentation-PMH


Hx Hypertension:  Yes


Hx Asthma:  Yes


Hx Diabetes:  Yes





Review of Systems


All Other Systems:  negative except mentioned in HPI





Physical Exam





Vital Signs








  Date Time  Temp Pulse Resp B/P Pulse Ox O2 Delivery O2 Flow Rate FiO2


 


7/3/17 20:25 98.8 98 14 134/79 98 Room Air  








Sp02 EP Interpretation:  reviewed, normal


General Appearance:  well appearing, no apparent distress, GCS 15


Head:  normocephalic


Eyes:  bilateral eye PERRL, bilateral eye normal inspection


ENT:  moist mucus membranes


Neck:  supple


Respiratory:  decreased breath sounds - R


Cardiovascular #1:  regular rate, rhythm


Cardiovascular #2:  2+ radial (R)


Gastrointestinal:  normal inspection, normal bowel sounds, non tender, no mass, 

non-distended


Musculoskeletal:  back normal, gait/station normal, normal range of motion


Neurologic:  alert, oriented x3, grossly normal


Psychiatric:  mood/affect normal


Skin:  normal inspection, warm/dry





Medical Decision Making


Diagnostic Impression:  


 Primary Impression:  


 Pleural effusion, malignant


 Additional Impressions:  


 CT dye allergy


 Lung mass


ER Course


Patient presents here for biopsy of a lung mass with a pleural effusion.  

Differential includes metastasis, lung CA, infectious etiology.  The 

reoccurrence of the fusion to suggest malignancy.  CT of the chest was reviewed 

(already performed).  Dr. Daniels suggested another CT of the chest.  In addition he 

wants a staging workup with a CT of the abdomen and pelvis with contrast.  The 

patient is in pain and needs to have appropriate analgesia.  She is going to be 

going for operation and preoperative labs EKG obtained.  Also chest x-ray and 

EKG will be obtained.





EKG unremarkable.  WBC slightly elevated.  Rest of labs unremarkable.





X-ray reveals a large pleural effusion on the right-hand side.  CT abdomen is 

basically unremarkable.  Again right-sided masses seen.  This was discussed 

with the daughter and the patient.  Patient was improved and required a second 

dose of morphine for analgesia.





Patient developed urticaria after CT contrast. Solumedrol and benadryl given 

with relief.





Admit med Dr. Quintanilla.





Laboratory Tests








Test


  7/3/17


21:30


 


White Blood Count


  11.5 K/UL


(4.8-10.8)  H


 


Red Blood Count


  4.43 M/UL


(4.20-5.40)


 


Hemoglobin


  12.0 G/DL


(12.0-16.0)


 


Hematocrit


  36.4 %


(37.0-47.0)  L


 


Mean Corpuscular Volume 82 FL (80-99)  


 


Mean Corpuscular Hemoglobin


  27.2 PG


(27.0-31.0)


 


Mean Corpuscular Hemoglobin


Concent 33.1 G/DL


(32.0-36.0)


 


Red Cell Distribution Width


  12.5 %


(11.6-14.8)


 


Platelet Count


  365 K/UL


(150-450)


 


Mean Platelet Volume


  7.2 FL


(6.5-10.1)


 


Neutrophils (%) (Auto)


  73.0 %


(45.0-75.0)


 


Lymphocytes (%) (Auto)


  16.5 %


(20.0-45.0)  L


 


Monocytes (%) (Auto)


  6.4 %


(1.0-10.0)


 


Eosinophils (%) (Auto)


  2.9 %


(0.0-3.0)


 


Basophils (%) (Auto)


  1.2 %


(0.0-2.0)


 


Prothrombin Time


  9.8 SEC


(9.30-11.50)


 


Prothrombin Time INR 0.9 (0.9-1.1)  


 


PTT


  26 SEC (23-33)


 


 


Urine Color Pale yellow  


 


Urine Appearance Clear  


 


Urine pH 7 (4.5-8.0)  


 


Urine Specific Gravity


  1.005


(1.005-1.035)


 


Urine Protein


  Negative


(NEGATIVE)


 


Urine Glucose (UA)


  Negative


(NEGATIVE)


 


Urine Ketones


  Negative


(NEGATIVE)


 


Urine Occult Blood


  Negative


(NEGATIVE)


 


Urine Nitrite


  Negative


(NEGATIVE)


 


Urine Bilirubin


  Negative


(NEGATIVE)


 


Urine Urobilinogen


  Normal MG/DL


(0.0-1.0)


 


Urine Leukocyte Esterase


  3+ (NEGATIVE)


H


 


Urine RBC


  0-2 /HPF (0 -


2)


 


Urine WBC


  2-4 /HPF (0 -


2)


 


Urine Squamous Epithelial


Cells Few /LPF


(NONE/OCC)


 


Urine Bacteria


  Few /HPF


(NONE)


 


Sodium Level


  135 mEQ/L


(135-145)


 


Potassium Level


  4.9 mEQ/L


(3.4-4.9)


 


Chloride Level


  95 mEQ/L


()  L


 


Carbon Dioxide Level


  27 mEQ/L


(20-30)


 


Anion Gap 13 (5-15)  


 


Blood Urea Nitrogen


  10 mg/dL


(7-23)


 


Creatinine


  0.7 mg/dL


(0.5-0.9)


 


Estimate Glomerular


Filtration Rate > 60 mL/min


(>60)


 


Glucose Level


  132 mg/dL


()  H


 


Lactic Acid Level


  1.60 mmol/L


(0.66-2.22)


 


Calcium Level


  9.9 mg/dL


(8.6-10.2)


 


Total Bilirubin


  < 0.2 mg/dL


(0.0-1.2)


 


Aspartate Amino Transferase


(AST) 25 U/L (5-40)  


 


 


Alanine Aminotransferase (ALT) 31 U/L (3-33)  


 


Alkaline Phosphatase


  89 U/L


()


 


Total Creatine Kinase


  41 U/L


()


 


Troponin I


  < 0.30 ng/mL


(<=0.30)


 


Pro-B-Type Natriuretic Peptide


  24 pg/mL


(0-125)


 


Total Protein


  7.4 g/dL


(6.6-8.7)


 


Albumin


  4.1 g/dL


(3.5-5.2)


 


Globulin 3.3 g/dL  


 


Albumin/Globulin Ratio 1.2 (1.0-2.7)  


 


Lipase 19 U/L (< 60)  








EKG Diagnostic Results


Rate:  normal


Rhythm:  NSR


ST Segments:  no acute changes





Rhythm Strip Diag. Results


EP Interpretation:  yes


Rhythm:  NSR, no PVC's, no ectopy





Chest X-Ray Diagnostic Results


Chest X-Ray Diagnostic Results :  


   Chest X-Ray Ordered:  Yes


   # of Views/Limited/Complete:  1 View


   Indication:  Other


   Interpretation:  no pneumothorax, other - effusion - large, no bony 

abnormalities





CT/MRI/US Diagnostic Results


CT/MRI/US Diagnostic Results :  


   Imaging Test Ordered:  pleural nodularity and mass


   Impression


see report





Last Vital Signs








  Date Time  Temp Pulse Resp B/P Pulse Ox O2 Delivery O2 Flow Rate FiO2


 


17 04:00 97.9 91 18 120/82 94 Nasal Cannula 2.0 








Status:  improved


Disposition:  ADMITTED AS INPATIENT


Condition:  Serious











Kirk Clark M.D. Jul 3, 2017 21:17 PAST MEDICAL HISTORY:  Breast fibroadenoma, right     Diabetes     Hashimoto's thyroiditis